# Patient Record
Sex: FEMALE | Race: WHITE
[De-identification: names, ages, dates, MRNs, and addresses within clinical notes are randomized per-mention and may not be internally consistent; named-entity substitution may affect disease eponyms.]

---

## 2021-04-02 ENCOUNTER — HOSPITAL ENCOUNTER (OUTPATIENT)
Age: 61
End: 2021-04-02
Payer: OTHER GOVERNMENT

## 2021-04-02 DIAGNOSIS — Z13.220: ICD-10-CM

## 2021-04-02 DIAGNOSIS — Z13.6: ICD-10-CM

## 2021-04-02 DIAGNOSIS — Z87.39: ICD-10-CM

## 2021-04-02 DIAGNOSIS — L90.0: Primary | ICD-10-CM

## 2021-04-02 LAB
ADD MANUAL DIFF / SLIDE REVIEW: NO
ALBUMIN SERPL-MCNC: 4.3 G/DL (ref 3.5–5)
ALBUMIN/GLOB SERPL: 1.3 {RATIO} (ref 1–2.8)
ALP SERPL-CCNC: 72 U/L (ref 38–126)
ALT SERPL-CCNC: 28 IU/L (ref ?–35)
BUN SERPL-MCNC: 19 MG/DL (ref 7–17)
CALCIUM SERPL-MCNC: 10.3 MG/DL (ref 8.4–10.2)
CHLORIDE SERPL-SCNC: 104 MMOL/L (ref 98–107)
CHOLEST SERPL-MCNC: 222 MG/DL (ref 140–199)
CO2 SERPL-SCNC: 35 MMOL/L (ref 22–32)
ESTIMATED GLOMERULAR FILT RATE: > 60 ML/MIN (ref 60–?)
GLOBULIN SER CALC-MCNC: 3.2 G/DL (ref 1.7–4.1)
GLUCOSE SERPL-MCNC: 71 MG/DL (ref 80–110)
HDLC SERPL-MCNC: 67 MG/DL (ref 40–60)
HEMATOCRIT: 43.9 % (ref 36–46)
HEMOGLOBIN: 14.9 G/DL (ref 12–16)
HEMOLYSIS: < 15 (ref 0–50)
LYMPHOCYTES # SPEC AUTO: 2000 /UL (ref 1100–4500)
MCV RBC: 90.8 FL (ref 80–100)
MEAN CORPUSCULAR HEMOGLOBIN: 30.9 PG (ref 26–34)
MEAN CORPUSCULAR HGB CONC: 34.1 % (ref 30–36)
PLATELET COUNT: 201 X10^3/UL (ref 150–400)
POTASSIUM SERPL-SCNC: 4.3 MMOL/L (ref 3.4–5.1)
PROT SERPL-MCNC: 7.5 G/DL (ref 6.3–8.2)
SODIUM SERPL-SCNC: 142 MMOL/L (ref 137–145)
TRIGL SERPL-MCNC: 129 MG/DL (ref 35–150)
URATE SERPL-MCNC: 3.6 MG/DL (ref 2.5–6.2)

## 2021-04-02 PROCEDURE — 36415 COLL VENOUS BLD VENIPUNCTURE: CPT

## 2021-04-02 PROCEDURE — 80061 LIPID PANEL: CPT

## 2021-04-02 PROCEDURE — 80053 COMPREHEN METABOLIC PANEL: CPT

## 2021-04-02 PROCEDURE — 84550 ASSAY OF BLOOD/URIC ACID: CPT

## 2021-04-02 PROCEDURE — 85025 COMPLETE CBC W/AUTO DIFF WBC: CPT

## 2021-04-13 ENCOUNTER — HOSPITAL ENCOUNTER (OUTPATIENT)
Dept: HOSPITAL 73 - LAB | Age: 61
End: 2021-04-13
Payer: OTHER GOVERNMENT

## 2021-04-13 DIAGNOSIS — R07.81: Primary | ICD-10-CM

## 2021-04-13 DIAGNOSIS — R91.1: ICD-10-CM

## 2021-04-13 PROCEDURE — 71101 X-RAY EXAM UNILAT RIBS/CHEST: CPT

## 2021-04-22 ENCOUNTER — HOSPITAL ENCOUNTER (OUTPATIENT)
Age: 61
End: 2021-04-22
Payer: OTHER GOVERNMENT

## 2021-04-22 DIAGNOSIS — R93.89: Primary | ICD-10-CM

## 2021-04-22 PROCEDURE — 71250 CT THORAX DX C-: CPT

## 2021-07-05 ENCOUNTER — HOSPITAL ENCOUNTER (OUTPATIENT)
Age: 61
End: 2021-07-05
Payer: OTHER GOVERNMENT

## 2021-07-05 DIAGNOSIS — Z20.822: ICD-10-CM

## 2021-07-05 DIAGNOSIS — Z01.812: Primary | ICD-10-CM

## 2021-07-05 PROCEDURE — 87635 SARS-COV-2 COVID-19 AMP PRB: CPT

## 2021-07-06 ENCOUNTER — HOSPITAL ENCOUNTER (OUTPATIENT)
Dept: HOSPITAL 73 - RAD | Age: 61
Discharge: HOME | End: 2021-07-06
Payer: OTHER GOVERNMENT

## 2021-07-06 VITALS
OXYGEN SATURATION: 97 % | DIASTOLIC BLOOD PRESSURE: 60 MMHG | HEART RATE: 74 BPM | RESPIRATION RATE: 16 BRPM | SYSTOLIC BLOOD PRESSURE: 95 MMHG

## 2021-07-06 VITALS
DIASTOLIC BLOOD PRESSURE: 80 MMHG | OXYGEN SATURATION: 100 % | HEART RATE: 75 BPM | SYSTOLIC BLOOD PRESSURE: 136 MMHG | RESPIRATION RATE: 14 BRPM

## 2021-07-06 VITALS
OXYGEN SATURATION: 96 % | HEART RATE: 62 BPM | SYSTOLIC BLOOD PRESSURE: 92 MMHG | DIASTOLIC BLOOD PRESSURE: 54 MMHG | RESPIRATION RATE: 12 BRPM

## 2021-07-06 VITALS
SYSTOLIC BLOOD PRESSURE: 83 MMHG | HEART RATE: 69 BPM | OXYGEN SATURATION: 98 % | RESPIRATION RATE: 14 BRPM | DIASTOLIC BLOOD PRESSURE: 52 MMHG

## 2021-07-06 VITALS
RESPIRATION RATE: 14 BRPM | DIASTOLIC BLOOD PRESSURE: 53 MMHG | SYSTOLIC BLOOD PRESSURE: 89 MMHG | OXYGEN SATURATION: 95 % | HEART RATE: 62 BPM

## 2021-07-06 VITALS
SYSTOLIC BLOOD PRESSURE: 112 MMHG | HEART RATE: 78 BPM | OXYGEN SATURATION: 97 % | TEMPERATURE: 97.1 F | DIASTOLIC BLOOD PRESSURE: 73 MMHG | RESPIRATION RATE: 20 BRPM

## 2021-07-06 VITALS
OXYGEN SATURATION: 95 % | DIASTOLIC BLOOD PRESSURE: 49 MMHG | HEART RATE: 68 BPM | RESPIRATION RATE: 16 BRPM | SYSTOLIC BLOOD PRESSURE: 77 MMHG

## 2021-07-06 VITALS
OXYGEN SATURATION: 100 % | SYSTOLIC BLOOD PRESSURE: 107 MMHG | HEART RATE: 72 BPM | RESPIRATION RATE: 14 BRPM | DIASTOLIC BLOOD PRESSURE: 69 MMHG

## 2021-07-06 VITALS
DIASTOLIC BLOOD PRESSURE: 50 MMHG | OXYGEN SATURATION: 94 % | SYSTOLIC BLOOD PRESSURE: 89 MMHG | RESPIRATION RATE: 16 BRPM | HEART RATE: 65 BPM

## 2021-07-06 VITALS
SYSTOLIC BLOOD PRESSURE: 112 MMHG | DIASTOLIC BLOOD PRESSURE: 73 MMHG | RESPIRATION RATE: 13 BRPM | OXYGEN SATURATION: 100 % | HEART RATE: 72 BPM

## 2021-07-06 DIAGNOSIS — M51.14: ICD-10-CM

## 2021-07-06 DIAGNOSIS — M48.04: Primary | ICD-10-CM

## 2021-07-06 PROCEDURE — 64479 NJX AA&/STRD TFRM EPI C/T 1: CPT

## 2021-07-06 PROCEDURE — 99152 MOD SED SAME PHYS/QHP 5/>YRS: CPT

## 2021-07-06 RX ADMIN — BUPIVACAINE HYDROCHLORIDE 2 ML: 2.5 INJECTION, SOLUTION EPIDURAL; INFILTRATION; INTRACAUDAL; PERINEURAL at 10:09

## 2021-07-06 RX ADMIN — IOPAMIDOL 3 ML: 612 INJECTION, SOLUTION INTRATHECAL at 10:06

## 2021-07-06 RX ADMIN — DEXAMETHASONE SODIUM PHOSPHATE 30 MG: 10 INJECTION INTRAMUSCULAR; INTRAVENOUS at 10:09

## 2021-07-06 RX ADMIN — FENTANYL CITRATE 50 MCG: 50 INJECTION, SOLUTION INTRAMUSCULAR; INTRAVENOUS at 10:06

## 2021-07-06 RX ADMIN — MIDAZOLAM HYDROCHLORIDE 5 MG: 1 INJECTION, SOLUTION INTRAMUSCULAR; INTRAVENOUS at 10:06

## 2022-01-25 ENCOUNTER — HOSPITAL ENCOUNTER (OUTPATIENT)
Age: 62
End: 2022-01-25
Payer: OTHER GOVERNMENT

## 2022-01-25 DIAGNOSIS — Z20.822: Primary | ICD-10-CM

## 2022-01-25 PROCEDURE — 87635 SARS-COV-2 COVID-19 AMP PRB: CPT

## 2022-01-27 ENCOUNTER — HOSPITAL ENCOUNTER (OUTPATIENT)
Age: 62
Discharge: HOME | End: 2022-01-27
Payer: OTHER GOVERNMENT

## 2022-01-27 VITALS
OXYGEN SATURATION: 99 % | RESPIRATION RATE: 18 BRPM | HEART RATE: 78 BPM | SYSTOLIC BLOOD PRESSURE: 112 MMHG | DIASTOLIC BLOOD PRESSURE: 63 MMHG

## 2022-01-27 VITALS
SYSTOLIC BLOOD PRESSURE: 115 MMHG | DIASTOLIC BLOOD PRESSURE: 80 MMHG | HEART RATE: 82 BPM | OXYGEN SATURATION: 99 % | RESPIRATION RATE: 13 BRPM

## 2022-01-27 VITALS
OXYGEN SATURATION: 100 % | HEART RATE: 81 BPM | DIASTOLIC BLOOD PRESSURE: 83 MMHG | SYSTOLIC BLOOD PRESSURE: 133 MMHG | RESPIRATION RATE: 20 BRPM

## 2022-01-27 VITALS
DIASTOLIC BLOOD PRESSURE: 74 MMHG | OXYGEN SATURATION: 96 % | RESPIRATION RATE: 16 BRPM | HEART RATE: 80 BPM | SYSTOLIC BLOOD PRESSURE: 110 MMHG | TEMPERATURE: 96.62 F

## 2022-01-27 VITALS
DIASTOLIC BLOOD PRESSURE: 68 MMHG | RESPIRATION RATE: 18 BRPM | SYSTOLIC BLOOD PRESSURE: 121 MMHG | HEART RATE: 79 BPM | OXYGEN SATURATION: 99 %

## 2022-01-27 VITALS
SYSTOLIC BLOOD PRESSURE: 111 MMHG | RESPIRATION RATE: 20 BRPM | HEART RATE: 84 BPM | OXYGEN SATURATION: 100 % | DIASTOLIC BLOOD PRESSURE: 76 MMHG

## 2022-01-27 VITALS
HEART RATE: 78 BPM | OXYGEN SATURATION: 100 % | SYSTOLIC BLOOD PRESSURE: 134 MMHG | RESPIRATION RATE: 20 BRPM | DIASTOLIC BLOOD PRESSURE: 86 MMHG

## 2022-01-27 VITALS
DIASTOLIC BLOOD PRESSURE: 74 MMHG | RESPIRATION RATE: 12 BRPM | SYSTOLIC BLOOD PRESSURE: 121 MMHG | HEART RATE: 76 BPM | OXYGEN SATURATION: 97 %

## 2022-01-27 VITALS
OXYGEN SATURATION: 100 % | RESPIRATION RATE: 12 BRPM | HEART RATE: 76 BPM | DIASTOLIC BLOOD PRESSURE: 74 MMHG | SYSTOLIC BLOOD PRESSURE: 126 MMHG

## 2022-01-27 DIAGNOSIS — M48.04: Primary | ICD-10-CM

## 2022-01-27 DIAGNOSIS — M51.14: ICD-10-CM

## 2022-01-27 PROCEDURE — 64479 NJX AA&/STRD TFRM EPI C/T 1: CPT

## 2022-01-27 PROCEDURE — 99152 MOD SED SAME PHYS/QHP 5/>YRS: CPT

## 2022-02-01 ENCOUNTER — HOSPITAL ENCOUNTER (OUTPATIENT)
Age: 62
End: 2022-02-01
Payer: OTHER GOVERNMENT

## 2022-02-01 DIAGNOSIS — F41.8: ICD-10-CM

## 2022-02-01 DIAGNOSIS — E78.5: Primary | ICD-10-CM

## 2022-02-01 LAB
ALBUMIN SERPL-MCNC: 3.8 G/DL (ref 3.5–5)
ALBUMIN/GLOB SERPL: 1.3 {RATIO} (ref 1–2.8)
ALP SERPL-CCNC: 54 U/L (ref 38–126)
ALT SERPL-CCNC: 21 IU/L (ref ?–35)
BUN SERPL-MCNC: 16 MG/DL (ref 7–17)
CALCIUM SERPL-MCNC: 8.8 MG/DL (ref 8.4–10.2)
CHLORIDE SERPL-SCNC: 104 MMOL/L (ref 98–107)
CHOLEST SERPL-MCNC: 197 MG/DL (ref 140–199)
CO2 SERPL-SCNC: 33 MMOL/L (ref 22–32)
ESTIMATED GLOMERULAR FILT RATE: > 60 ML/MIN (ref 60–?)
GLOBULIN SER CALC-MCNC: 3 G/DL (ref 1.7–4.1)
GLUCOSE SERPL-MCNC: 100 MG/DL (ref 80–110)
HDLC SERPL-MCNC: 69 MG/DL (ref 40–60)
HEMOLYSIS: < 15 (ref 0–50)
POTASSIUM SERPL-SCNC: 4.1 MMOL/L (ref 3.4–5.1)
PROT SERPL-MCNC: 6.8 G/DL (ref 6.3–8.2)
SODIUM SERPL-SCNC: 139 MMOL/L (ref 137–145)
TRIGL SERPL-MCNC: 138 MG/DL (ref 35–150)
TSH W/ REFLEX TO FT4: 1.38 UIU/ML (ref 0.47–4.68)

## 2022-02-01 PROCEDURE — 80061 LIPID PANEL: CPT

## 2022-02-01 PROCEDURE — 36415 COLL VENOUS BLD VENIPUNCTURE: CPT

## 2022-02-01 PROCEDURE — 80053 COMPREHEN METABOLIC PANEL: CPT

## 2022-02-01 PROCEDURE — 84443 ASSAY THYROID STIM HORMONE: CPT

## 2022-05-19 ENCOUNTER — HOSPITAL ENCOUNTER (OUTPATIENT)
Age: 62
End: 2022-05-19
Payer: OTHER GOVERNMENT

## 2022-05-19 DIAGNOSIS — Z12.31: Primary | ICD-10-CM

## 2022-05-19 DIAGNOSIS — M48.02: ICD-10-CM

## 2022-05-19 DIAGNOSIS — M50.11: ICD-10-CM

## 2022-05-19 DIAGNOSIS — M47.22: ICD-10-CM

## 2022-05-19 DIAGNOSIS — Z80.3: ICD-10-CM

## 2022-05-19 PROCEDURE — 77063 BREAST TOMOSYNTHESIS BI: CPT

## 2022-05-19 PROCEDURE — 72141 MRI NECK SPINE W/O DYE: CPT

## 2022-05-19 PROCEDURE — 77067 SCR MAMMO BI INCL CAD: CPT

## 2022-06-06 ENCOUNTER — HOSPITAL ENCOUNTER (OUTPATIENT)
Age: 62
End: 2022-06-06
Payer: OTHER GOVERNMENT

## 2022-06-06 DIAGNOSIS — Z20.822: Primary | ICD-10-CM

## 2022-06-06 PROCEDURE — 87635 SARS-COV-2 COVID-19 AMP PRB: CPT

## 2022-06-07 ENCOUNTER — HOSPITAL ENCOUNTER (OUTPATIENT)
Dept: HOSPITAL 73 - RAD | Age: 62
Discharge: HOME | End: 2022-06-07
Payer: OTHER GOVERNMENT

## 2022-06-07 VITALS
HEART RATE: 79 BPM | RESPIRATION RATE: 14 BRPM | SYSTOLIC BLOOD PRESSURE: 117 MMHG | OXYGEN SATURATION: 100 % | DIASTOLIC BLOOD PRESSURE: 78 MMHG

## 2022-06-07 VITALS
RESPIRATION RATE: 18 BRPM | HEART RATE: 71 BPM | DIASTOLIC BLOOD PRESSURE: 79 MMHG | SYSTOLIC BLOOD PRESSURE: 125 MMHG | OXYGEN SATURATION: 96 %

## 2022-06-07 VITALS
RESPIRATION RATE: 20 BRPM | OXYGEN SATURATION: 99 % | SYSTOLIC BLOOD PRESSURE: 122 MMHG | HEART RATE: 69 BPM | DIASTOLIC BLOOD PRESSURE: 71 MMHG

## 2022-06-07 VITALS
OXYGEN SATURATION: 98 % | RESPIRATION RATE: 14 BRPM | DIASTOLIC BLOOD PRESSURE: 68 MMHG | SYSTOLIC BLOOD PRESSURE: 108 MMHG | HEART RATE: 76 BPM

## 2022-06-07 VITALS
OXYGEN SATURATION: 96 % | TEMPERATURE: 97.52 F | RESPIRATION RATE: 18 BRPM | SYSTOLIC BLOOD PRESSURE: 109 MMHG | HEART RATE: 78 BPM | DIASTOLIC BLOOD PRESSURE: 78 MMHG

## 2022-06-07 VITALS
SYSTOLIC BLOOD PRESSURE: 123 MMHG | HEART RATE: 69 BPM | DIASTOLIC BLOOD PRESSURE: 73 MMHG | OXYGEN SATURATION: 98 % | RESPIRATION RATE: 14 BRPM

## 2022-06-07 VITALS
RESPIRATION RATE: 21 BRPM | HEART RATE: 75 BPM | OXYGEN SATURATION: 97 % | SYSTOLIC BLOOD PRESSURE: 125 MMHG | DIASTOLIC BLOOD PRESSURE: 70 MMHG

## 2022-06-07 VITALS
OXYGEN SATURATION: 96 % | SYSTOLIC BLOOD PRESSURE: 116 MMHG | DIASTOLIC BLOOD PRESSURE: 72 MMHG | RESPIRATION RATE: 16 BRPM | HEART RATE: 78 BPM

## 2022-06-07 VITALS
HEART RATE: 74 BPM | DIASTOLIC BLOOD PRESSURE: 75 MMHG | SYSTOLIC BLOOD PRESSURE: 136 MMHG | OXYGEN SATURATION: 98 % | RESPIRATION RATE: 15 BRPM

## 2022-06-07 VITALS
OXYGEN SATURATION: 100 % | SYSTOLIC BLOOD PRESSURE: 146 MMHG | HEART RATE: 80 BPM | RESPIRATION RATE: 19 BRPM | DIASTOLIC BLOOD PRESSURE: 85 MMHG

## 2022-06-07 DIAGNOSIS — M50.123: ICD-10-CM

## 2022-06-07 DIAGNOSIS — M48.02: Primary | ICD-10-CM

## 2022-06-07 PROCEDURE — 62321 NJX INTERLAMINAR CRV/THRC: CPT

## 2022-06-07 PROCEDURE — 99152 MOD SED SAME PHYS/QHP 5/>YRS: CPT

## 2022-06-07 RX ADMIN — IOPAMIDOL 3 ML: 612 INJECTION, SOLUTION INTRATHECAL at 09:22

## 2022-06-07 RX ADMIN — BUPIVACAINE HYDROCHLORIDE 2 ML: 2.5 INJECTION, SOLUTION EPIDURAL; INFILTRATION; INTRACAUDAL; PERINEURAL at 09:22

## 2022-06-07 RX ADMIN — MIDAZOLAM HYDROCHLORIDE 2 MG: 1 INJECTION, SOLUTION INTRAMUSCULAR; INTRAVENOUS at 09:17

## 2022-06-07 RX ADMIN — MIDAZOLAM HYDROCHLORIDE 2 MG: 1 INJECTION, SOLUTION INTRAMUSCULAR; INTRAVENOUS at 09:24

## 2022-06-07 RX ADMIN — DEXAMETHASONE SODIUM PHOSPHATE 30 MG: 10 INJECTION INTRAMUSCULAR; INTRAVENOUS at 09:22

## 2022-09-09 ENCOUNTER — HOSPITAL ENCOUNTER (OUTPATIENT)
Age: 62
End: 2022-09-09
Payer: OTHER GOVERNMENT

## 2022-09-09 DIAGNOSIS — K52.9: Primary | ICD-10-CM

## 2022-09-09 LAB
ADD MANUAL DIFF / SLIDE REVIEW: NO
ALBUMIN SERPL-MCNC: 4.2 G/DL (ref 3.5–5)
ALBUMIN/GLOB SERPL: 1.2 {RATIO} (ref 1–2.8)
ALP SERPL-CCNC: 71 U/L (ref 38–126)
ALT SERPL-CCNC: 40 IU/L (ref ?–35)
BUN SERPL-MCNC: 16 MG/DL (ref 7–17)
CALCIUM SERPL-MCNC: 9.3 MG/DL (ref 8.4–10.2)
CHLORIDE SERPL-SCNC: 104 MMOL/L (ref 98–107)
CO2 SERPL-SCNC: 31 MMOL/L (ref 22–32)
ESTIMATED GLOMERULAR FILT RATE: > 60 ML/MIN (ref 60–?)
GLOBULIN SER CALC-MCNC: 3.5 G/DL (ref 1.7–4.1)
GLUCOSE SERPL-MCNC: 90 MG/DL (ref 80–110)
HEMATOCRIT: 40.9 % (ref 36–46)
HEMOGLOBIN: 14.1 G/DL (ref 12–16)
HEMOLYSIS: < 15 (ref 0–50)
LYMPHOCYTES # SPEC AUTO: 1000 /UL (ref 1100–4500)
MCV RBC: 92.4 FL (ref 80–100)
MEAN CORPUSCULAR HEMOGLOBIN: 31.9 PG (ref 26–34)
MEAN CORPUSCULAR HGB CONC: 34.5 % (ref 30–36)
PLATELET COUNT: 160 X10^3/UL (ref 150–400)
POTASSIUM SERPL-SCNC: 4.2 MMOL/L (ref 3.4–5.1)
PROT SERPL-MCNC: 7.7 G/DL (ref 6.3–8.2)
SODIUM SERPL-SCNC: 140 MMOL/L (ref 137–145)

## 2022-09-09 PROCEDURE — 85025 COMPLETE CBC W/AUTO DIFF WBC: CPT

## 2022-09-09 PROCEDURE — 36415 COLL VENOUS BLD VENIPUNCTURE: CPT

## 2022-09-09 PROCEDURE — 80053 COMPREHEN METABOLIC PANEL: CPT

## 2022-09-09 PROCEDURE — 85651 RBC SED RATE NONAUTOMATED: CPT

## 2022-09-09 PROCEDURE — 86140 C-REACTIVE PROTEIN: CPT

## 2022-09-09 PROCEDURE — 74018 RADEX ABDOMEN 1 VIEW: CPT

## 2022-09-15 ENCOUNTER — HOSPITAL ENCOUNTER (OUTPATIENT)
Age: 62
End: 2022-09-15
Payer: OTHER GOVERNMENT

## 2022-09-15 DIAGNOSIS — K52.9: Primary | ICD-10-CM

## 2022-09-15 PROCEDURE — 82270 OCCULT BLOOD FECES: CPT

## 2022-09-15 PROCEDURE — 87205 SMEAR GRAM STAIN: CPT

## 2022-09-20 ENCOUNTER — HOSPITAL ENCOUNTER (OUTPATIENT)
Age: 62
End: 2022-09-20
Payer: OTHER GOVERNMENT
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
## 2022-09-20 DIAGNOSIS — M25.532: Primary | ICD-10-CM

## 2022-09-20 PROCEDURE — 73110 X-RAY EXAM OF WRIST: CPT

## 2022-09-20 PROCEDURE — 73130 X-RAY EXAM OF HAND: CPT

## 2022-09-20 PROCEDURE — 73090 X-RAY EXAM OF FOREARM: CPT

## 2022-09-20 PROCEDURE — 73080 X-RAY EXAM OF ELBOW: CPT

## 2023-02-15 ENCOUNTER — HOSPITAL ENCOUNTER (OUTPATIENT)
Age: 63
End: 2023-02-15
Payer: OTHER GOVERNMENT

## 2023-02-15 DIAGNOSIS — M35.00: ICD-10-CM

## 2023-02-15 DIAGNOSIS — M45.9: Primary | ICD-10-CM

## 2023-02-15 DIAGNOSIS — D84.9: ICD-10-CM

## 2023-02-15 DIAGNOSIS — Z51.81: ICD-10-CM

## 2023-02-15 LAB
ADD MANUAL DIFF / SLIDE REVIEW: NO
ALBUMIN SERPL-MCNC: 4.1 G/DL (ref 3.5–5)
ALBUMIN/GLOB SERPL: 1.2 {RATIO} (ref 1–2.8)
ALP SERPL-CCNC: 67 U/L (ref 38–126)
ALT SERPL-CCNC: 17 IU/L (ref ?–35)
BUN SERPL-MCNC: 11 MG/DL (ref 7–17)
CALCIUM SERPL-MCNC: 8.8 MG/DL (ref 8.4–10.2)
CHLORIDE SERPL-SCNC: 107 MMOL/L (ref 98–107)
CO2 SERPL-SCNC: 26 MMOL/L (ref 22–32)
ESTIMATED GLOMERULAR FILT RATE: > 60 ML/MIN (ref 60–?)
GLOBULIN SER CALC-MCNC: 3.5 G/DL (ref 1.7–4.1)
GLUCOSE SERPL-MCNC: 95 MG/DL (ref 80–110)
HEMATOCRIT: 41.6 % (ref 36–46)
HEMOGLOBIN: 14.2 G/DL (ref 12–16)
HEMOLYSIS: < 15 (ref 0–50)
LYMPHOCYTES # SPEC AUTO: 1300 /UL (ref 1100–4500)
MCV RBC: 91.1 FL (ref 80–100)
MEAN CORPUSCULAR HEMOGLOBIN: 31.1 PG (ref 26–34)
MEAN CORPUSCULAR HGB CONC: 34.1 % (ref 30–36)
PLATELET COUNT: 157 X10^3/UL (ref 150–400)
POTASSIUM SERPL-SCNC: 3.6 MMOL/L (ref 3.4–5.1)
PROT SERPL-MCNC: 7.6 G/DL (ref 6.3–8.2)
SODIUM SERPL-SCNC: 138 MMOL/L (ref 137–145)

## 2023-02-15 PROCEDURE — 86140 C-REACTIVE PROTEIN: CPT

## 2023-02-15 PROCEDURE — 85651 RBC SED RATE NONAUTOMATED: CPT

## 2023-02-15 PROCEDURE — 80053 COMPREHEN METABOLIC PANEL: CPT

## 2023-02-15 PROCEDURE — 85025 COMPLETE CBC W/AUTO DIFF WBC: CPT

## 2023-02-15 PROCEDURE — 36415 COLL VENOUS BLD VENIPUNCTURE: CPT

## 2023-03-07 ENCOUNTER — HOSPITAL ENCOUNTER (OUTPATIENT)
Age: 63
Discharge: HOME | End: 2023-03-07
Payer: OTHER GOVERNMENT

## 2023-03-07 VITALS
SYSTOLIC BLOOD PRESSURE: 139 MMHG | OXYGEN SATURATION: 99 % | HEART RATE: 84 BPM | RESPIRATION RATE: 22 BRPM | DIASTOLIC BLOOD PRESSURE: 78 MMHG

## 2023-03-07 VITALS
OXYGEN SATURATION: 99 % | DIASTOLIC BLOOD PRESSURE: 70 MMHG | SYSTOLIC BLOOD PRESSURE: 132 MMHG | HEART RATE: 85 BPM | RESPIRATION RATE: 16 BRPM

## 2023-03-07 VITALS
HEART RATE: 89 BPM | RESPIRATION RATE: 13 BRPM | OXYGEN SATURATION: 98 % | SYSTOLIC BLOOD PRESSURE: 137 MMHG | DIASTOLIC BLOOD PRESSURE: 92 MMHG

## 2023-03-07 VITALS
DIASTOLIC BLOOD PRESSURE: 87 MMHG | RESPIRATION RATE: 17 BRPM | SYSTOLIC BLOOD PRESSURE: 133 MMHG | OXYGEN SATURATION: 99 % | HEART RATE: 82 BPM

## 2023-03-07 VITALS
SYSTOLIC BLOOD PRESSURE: 122 MMHG | DIASTOLIC BLOOD PRESSURE: 90 MMHG | HEART RATE: 82 BPM | OXYGEN SATURATION: 98 % | RESPIRATION RATE: 15 BRPM

## 2023-03-07 VITALS
HEART RATE: 86 BPM | OXYGEN SATURATION: 96 % | DIASTOLIC BLOOD PRESSURE: 79 MMHG | TEMPERATURE: 96.9 F | SYSTOLIC BLOOD PRESSURE: 110 MMHG | RESPIRATION RATE: 16 BRPM

## 2023-03-07 VITALS
DIASTOLIC BLOOD PRESSURE: 89 MMHG | OXYGEN SATURATION: 98 % | HEART RATE: 84 BPM | RESPIRATION RATE: 17 BRPM | SYSTOLIC BLOOD PRESSURE: 125 MMHG

## 2023-03-07 VITALS
DIASTOLIC BLOOD PRESSURE: 82 MMHG | SYSTOLIC BLOOD PRESSURE: 141 MMHG | OXYGEN SATURATION: 100 % | HEART RATE: 82 BPM | RESPIRATION RATE: 14 BRPM

## 2023-03-07 DIAGNOSIS — M51.14: ICD-10-CM

## 2023-03-07 DIAGNOSIS — M48.04: Primary | ICD-10-CM

## 2023-03-07 PROCEDURE — 99152 MOD SED SAME PHYS/QHP 5/>YRS: CPT

## 2023-03-07 PROCEDURE — 64479 NJX AA&/STRD TFRM EPI C/T 1: CPT

## 2023-06-30 ENCOUNTER — HOSPITAL ENCOUNTER (EMERGENCY)
Age: 63
Discharge: HOME | End: 2023-06-30
Payer: OTHER GOVERNMENT

## 2023-06-30 VITALS — OXYGEN SATURATION: 100 % | DIASTOLIC BLOOD PRESSURE: 94 MMHG | SYSTOLIC BLOOD PRESSURE: 163 MMHG | HEART RATE: 81 BPM

## 2023-06-30 VITALS — DIASTOLIC BLOOD PRESSURE: 83 MMHG | SYSTOLIC BLOOD PRESSURE: 152 MMHG | OXYGEN SATURATION: 99 % | HEART RATE: 76 BPM

## 2023-06-30 VITALS — SYSTOLIC BLOOD PRESSURE: 152 MMHG | HEART RATE: 73 BPM | DIASTOLIC BLOOD PRESSURE: 89 MMHG | OXYGEN SATURATION: 98 %

## 2023-06-30 VITALS — BODY MASS INDEX: 29 KG/M2

## 2023-06-30 VITALS — HEART RATE: 78 BPM | SYSTOLIC BLOOD PRESSURE: 118 MMHG | DIASTOLIC BLOOD PRESSURE: 65 MMHG | OXYGEN SATURATION: 98 %

## 2023-06-30 VITALS
DIASTOLIC BLOOD PRESSURE: 89 MMHG | OXYGEN SATURATION: 99 % | SYSTOLIC BLOOD PRESSURE: 195 MMHG | HEART RATE: 82 BPM | TEMPERATURE: 97.52 F | RESPIRATION RATE: 16 BRPM

## 2023-06-30 VITALS — HEART RATE: 82 BPM | SYSTOLIC BLOOD PRESSURE: 153 MMHG | DIASTOLIC BLOOD PRESSURE: 88 MMHG | OXYGEN SATURATION: 97 %

## 2023-06-30 VITALS — HEART RATE: 75 BPM | OXYGEN SATURATION: 99 %

## 2023-06-30 VITALS — DIASTOLIC BLOOD PRESSURE: 88 MMHG | HEART RATE: 86 BPM | OXYGEN SATURATION: 97 % | SYSTOLIC BLOOD PRESSURE: 156 MMHG

## 2023-06-30 DIAGNOSIS — U07.1: Primary | ICD-10-CM

## 2023-06-30 DIAGNOSIS — R11.2: ICD-10-CM

## 2023-06-30 LAB
ADD MANUAL DIFF / SLIDE REVIEW: NO
ALBUMIN SERPL-MCNC: 4.4 G/DL (ref 3.5–5)
ALBUMIN/GLOB SERPL: 1.2 {RATIO} (ref 1–2.8)
ALP SERPL-CCNC: 70 U/L (ref 38–126)
ALT SERPL-CCNC: 29 IU/L (ref ?–35)
BUN SERPL-MCNC: 19 MG/DL (ref 7–17)
CALCIUM SERPL-MCNC: 9.3 MG/DL (ref 8.4–10.2)
CHLORIDE SERPL-SCNC: 101 MMOL/L (ref 98–107)
CO2 SERPL-SCNC: 27 MMOL/L (ref 22–32)
ESTIMATED GLOMERULAR FILT RATE: > 60 ML/MIN (ref 60–?)
GLOBULIN SER CALC-MCNC: 3.8 G/DL (ref 1.7–4.1)
GLUCOSE SERPL-MCNC: 89 MG/DL (ref 80–110)
HEMATOCRIT: 41.8 % (ref 36–46)
HEMOGLOBIN: 14.7 G/DL (ref 12–16)
HEMOLYSIS: < 15 (ref 0–50)
LYMPHOCYTES # SPEC AUTO: 1500 /UL (ref 1100–4500)
MCV RBC: 90.3 FL (ref 80–100)
MEAN CORPUSCULAR HEMOGLOBIN: 31.7 PG (ref 26–34)
MEAN CORPUSCULAR HGB CONC: 35.1 % (ref 30–36)
PLATELET COUNT: 163 X10^3/UL (ref 150–400)
POTASSIUM SERPL-SCNC: 3.8 MMOL/L (ref 3.4–5.1)
PROT SERPL-MCNC: 8.2 G/DL (ref 6.3–8.2)
SODIUM SERPL-SCNC: 137 MMOL/L (ref 137–145)

## 2023-06-30 PROCEDURE — 80053 COMPREHEN METABOLIC PANEL: CPT

## 2023-06-30 PROCEDURE — 96374 THER/PROPH/DIAG INJ IV PUSH: CPT

## 2023-06-30 PROCEDURE — 96375 TX/PRO/DX INJ NEW DRUG ADDON: CPT

## 2023-06-30 PROCEDURE — 99284 EMERGENCY DEPT VISIT MOD MDM: CPT

## 2023-06-30 PROCEDURE — 85025 COMPLETE CBC W/AUTO DIFF WBC: CPT

## 2023-06-30 PROCEDURE — 36415 COLL VENOUS BLD VENIPUNCTURE: CPT

## 2023-06-30 PROCEDURE — 96361 HYDRATE IV INFUSION ADD-ON: CPT

## 2023-07-17 ENCOUNTER — HOSPITAL ENCOUNTER (OUTPATIENT)
Age: 63
End: 2023-07-17
Payer: OTHER GOVERNMENT

## 2023-07-17 DIAGNOSIS — M47.819: Primary | ICD-10-CM

## 2023-07-17 LAB
ADD MANUAL DIFF / SLIDE REVIEW: NO
ALBUMIN SERPL-MCNC: 4 G/DL (ref 3.5–5)
ALBUMIN/GLOB SERPL: 1.3 {RATIO} (ref 1–2.8)
ALP SERPL-CCNC: 53 U/L (ref 38–126)
ALT SERPL-CCNC: 21 IU/L (ref ?–35)
BUN SERPL-MCNC: 13 MG/DL (ref 7–17)
CALCIUM SERPL-MCNC: 9.2 MG/DL (ref 8.4–10.2)
CHLORIDE SERPL-SCNC: 102 MMOL/L (ref 98–107)
CO2 SERPL-SCNC: 30 MMOL/L (ref 22–32)
ESTIMATED GLOMERULAR FILT RATE: > 60 ML/MIN (ref 60–?)
GLOBULIN SER CALC-MCNC: 3.2 G/DL (ref 1.7–4.1)
GLUCOSE SERPL-MCNC: 94 MG/DL (ref 80–110)
HEMATOCRIT: 36.3 % (ref 36–46)
HEMOGLOBIN: 12.8 G/DL (ref 12–16)
HEMOLYSIS: < 15 (ref 0–50)
LYMPHOCYTES # SPEC AUTO: 900 /UL (ref 1100–4500)
MCV RBC: 90.6 FL (ref 80–100)
MEAN CORPUSCULAR HEMOGLOBIN: 31.9 PG (ref 26–34)
MEAN CORPUSCULAR HGB CONC: 35.2 % (ref 30–36)
PLATELET COUNT: 181 X10^3/UL (ref 150–400)
POTASSIUM SERPL-SCNC: 3.3 MMOL/L (ref 3.4–5.1)
PROT SERPL-MCNC: 7.2 G/DL (ref 6.3–8.2)
SODIUM SERPL-SCNC: 139 MMOL/L (ref 137–145)

## 2023-07-17 PROCEDURE — 86140 C-REACTIVE PROTEIN: CPT

## 2023-07-17 PROCEDURE — 85651 RBC SED RATE NONAUTOMATED: CPT

## 2023-07-17 PROCEDURE — 80053 COMPREHEN METABOLIC PANEL: CPT

## 2023-07-17 PROCEDURE — 85025 COMPLETE CBC W/AUTO DIFF WBC: CPT

## 2023-07-17 PROCEDURE — 36415 COLL VENOUS BLD VENIPUNCTURE: CPT

## 2023-07-25 ENCOUNTER — HOSPITAL ENCOUNTER (OUTPATIENT)
Age: 63
End: 2023-07-25
Payer: OTHER GOVERNMENT

## 2023-07-25 DIAGNOSIS — Z80.3: ICD-10-CM

## 2023-07-25 DIAGNOSIS — Z12.31: Primary | ICD-10-CM

## 2023-07-25 PROCEDURE — 77067 SCR MAMMO BI INCL CAD: CPT

## 2023-07-25 PROCEDURE — 77063 BREAST TOMOSYNTHESIS BI: CPT

## 2023-11-08 ENCOUNTER — HOSPITAL ENCOUNTER (OUTPATIENT)
Age: 63
End: 2023-11-08
Payer: OTHER GOVERNMENT

## 2023-11-08 DIAGNOSIS — M54.50: ICD-10-CM

## 2023-11-08 DIAGNOSIS — M79.606: ICD-10-CM

## 2023-11-08 DIAGNOSIS — D84.9: ICD-10-CM

## 2023-11-08 DIAGNOSIS — R21: ICD-10-CM

## 2023-11-08 DIAGNOSIS — M45.9: ICD-10-CM

## 2023-11-08 DIAGNOSIS — M47.816: ICD-10-CM

## 2023-11-08 DIAGNOSIS — M47.817: Primary | ICD-10-CM

## 2023-11-08 DIAGNOSIS — E78.5: ICD-10-CM

## 2023-11-08 DIAGNOSIS — M06.9: ICD-10-CM

## 2023-11-08 LAB
ADD MANUAL DIFF / SLIDE REVIEW: NO
ALBUMIN SERPL-MCNC: 4.1 G/DL (ref 3.5–5)
ALBUMIN/GLOB SERPL: 1.3 {RATIO} (ref 1–2.8)
ALP SERPL-CCNC: 60 U/L (ref 38–126)
ALT SERPL-CCNC: 19 IU/L (ref ?–35)
BUN SERPL-MCNC: 17 MG/DL (ref 7–17)
CALCIUM SERPL-MCNC: 9.4 MG/DL (ref 8.4–10.2)
CHLORIDE SERPL-SCNC: 106 MMOL/L (ref 98–107)
CHOLEST SERPL-MCNC: 248 MG/DL (ref 140–199)
CO2 SERPL-SCNC: 24 MMOL/L (ref 22–32)
CRP SERPL HS-MCNC: 9 MG/L (ref 1–3)
ESTIMATED GLOMERULAR FILT RATE: > 60 ML/MIN (ref 60–?)
GLOBULIN SER CALC-MCNC: 3.1 G/DL (ref 1.7–4.1)
GLUCOSE SERPL-MCNC: 100 MG/DL (ref 80–110)
HDLC SERPL-MCNC: 79 MG/DL (ref 40–60)
HEMATOCRIT: 41.9 % (ref 36–46)
HEMOGLOBIN: 14.5 G/DL (ref 12–16)
HEMOLYSIS: < 15 (ref 0–50)
LYMPHOCYTES # SPEC AUTO: 1200 /UL (ref 1100–4500)
MCV RBC: 89 FL (ref 80–100)
MEAN CORPUSCULAR HEMOGLOBIN: 30.8 PG (ref 26–34)
MEAN CORPUSCULAR HGB CONC: 34.6 % (ref 30–36)
PLATELET COUNT: 183 X10^3/UL (ref 150–400)
POTASSIUM SERPL-SCNC: 4 MMOL/L (ref 3.4–5.1)
PROT SERPL-MCNC: 7.2 G/DL (ref 6.3–8.2)
SODIUM SERPL-SCNC: 140 MMOL/L (ref 137–145)
TRIGL SERPL-MCNC: 118 MG/DL (ref 35–150)

## 2023-11-08 PROCEDURE — 85651 RBC SED RATE NONAUTOMATED: CPT

## 2023-11-08 PROCEDURE — 72100 X-RAY EXAM L-S SPINE 2/3 VWS: CPT

## 2023-11-08 PROCEDURE — 85025 COMPLETE CBC W/AUTO DIFF WBC: CPT

## 2023-11-08 PROCEDURE — 83516 IMMUNOASSAY NONANTIBODY: CPT

## 2023-11-08 PROCEDURE — 36415 COLL VENOUS BLD VENIPUNCTURE: CPT

## 2023-11-08 PROCEDURE — 86140 C-REACTIVE PROTEIN: CPT

## 2023-11-08 PROCEDURE — 80061 LIPID PANEL: CPT

## 2023-11-08 PROCEDURE — 86235 NUCLEAR ANTIGEN ANTIBODY: CPT

## 2023-11-08 PROCEDURE — 80053 COMPREHEN METABOLIC PANEL: CPT

## 2023-11-08 PROCEDURE — 86038 ANTINUCLEAR ANTIBODIES: CPT

## 2023-11-08 PROCEDURE — 86225 DNA ANTIBODY NATIVE: CPT

## 2023-11-14 ENCOUNTER — HOSPITAL ENCOUNTER (OUTPATIENT)
Age: 63
End: 2023-11-14
Payer: OTHER GOVERNMENT

## 2023-11-14 DIAGNOSIS — M51.37: ICD-10-CM

## 2023-11-14 DIAGNOSIS — M48.061: ICD-10-CM

## 2023-11-14 DIAGNOSIS — G89.29: ICD-10-CM

## 2023-11-14 DIAGNOSIS — M51.36: ICD-10-CM

## 2023-11-14 DIAGNOSIS — M47.817: ICD-10-CM

## 2023-11-14 DIAGNOSIS — M48.07: ICD-10-CM

## 2023-11-14 DIAGNOSIS — M47.816: Primary | ICD-10-CM

## 2023-11-14 DIAGNOSIS — M79.606: ICD-10-CM

## 2023-11-14 DIAGNOSIS — M54.50: ICD-10-CM

## 2023-11-14 PROCEDURE — 72148 MRI LUMBAR SPINE W/O DYE: CPT

## 2023-12-10 ENCOUNTER — HOSPITAL ENCOUNTER (OUTPATIENT)
Dept: HOSPITAL 73 - ED | Age: 63
Setting detail: OBSERVATION
LOS: 1 days | Discharge: HOME | End: 2023-12-11
Payer: OTHER GOVERNMENT

## 2023-12-10 VITALS
DIASTOLIC BLOOD PRESSURE: 81 MMHG | HEART RATE: 86 BPM | RESPIRATION RATE: 22 BRPM | OXYGEN SATURATION: 100 % | SYSTOLIC BLOOD PRESSURE: 161 MMHG

## 2023-12-10 VITALS
OXYGEN SATURATION: 95 % | DIASTOLIC BLOOD PRESSURE: 75 MMHG | HEART RATE: 84 BPM | SYSTOLIC BLOOD PRESSURE: 125 MMHG | RESPIRATION RATE: 13 BRPM

## 2023-12-10 VITALS
RESPIRATION RATE: 22 BRPM | DIASTOLIC BLOOD PRESSURE: 100 MMHG | HEART RATE: 103 BPM | SYSTOLIC BLOOD PRESSURE: 185 MMHG | TEMPERATURE: 96.44 F | OXYGEN SATURATION: 100 %

## 2023-12-10 VITALS
HEART RATE: 75 BPM | DIASTOLIC BLOOD PRESSURE: 91 MMHG | SYSTOLIC BLOOD PRESSURE: 168 MMHG | OXYGEN SATURATION: 99 % | RESPIRATION RATE: 18 BRPM

## 2023-12-10 VITALS
DIASTOLIC BLOOD PRESSURE: 94 MMHG | OXYGEN SATURATION: 99 % | TEMPERATURE: 96.6 F | HEART RATE: 75 BPM | RESPIRATION RATE: 18 BRPM | SYSTOLIC BLOOD PRESSURE: 153 MMHG

## 2023-12-10 VITALS — HEART RATE: 94 BPM | SYSTOLIC BLOOD PRESSURE: 127 MMHG | DIASTOLIC BLOOD PRESSURE: 77 MMHG

## 2023-12-10 VITALS
OXYGEN SATURATION: 100 % | SYSTOLIC BLOOD PRESSURE: 169 MMHG | HEART RATE: 79 BPM | DIASTOLIC BLOOD PRESSURE: 85 MMHG | RESPIRATION RATE: 16 BRPM

## 2023-12-10 VITALS — DIASTOLIC BLOOD PRESSURE: 73 MMHG | SYSTOLIC BLOOD PRESSURE: 136 MMHG

## 2023-12-10 VITALS
OXYGEN SATURATION: 93 % | SYSTOLIC BLOOD PRESSURE: 139 MMHG | HEART RATE: 93 BPM | RESPIRATION RATE: 19 BRPM | DIASTOLIC BLOOD PRESSURE: 73 MMHG

## 2023-12-10 VITALS — SYSTOLIC BLOOD PRESSURE: 156 MMHG | HEART RATE: 75 BPM | DIASTOLIC BLOOD PRESSURE: 83 MMHG | OXYGEN SATURATION: 97 %

## 2023-12-10 VITALS
HEART RATE: 94 BPM | OXYGEN SATURATION: 91 % | DIASTOLIC BLOOD PRESSURE: 67 MMHG | SYSTOLIC BLOOD PRESSURE: 127 MMHG | RESPIRATION RATE: 19 BRPM

## 2023-12-10 VITALS
RESPIRATION RATE: 22 BRPM | HEART RATE: 79 BPM | SYSTOLIC BLOOD PRESSURE: 196 MMHG | OXYGEN SATURATION: 99 % | DIASTOLIC BLOOD PRESSURE: 90 MMHG

## 2023-12-10 VITALS — OXYGEN SATURATION: 99 % | RESPIRATION RATE: 21 BRPM | HEART RATE: 78 BPM

## 2023-12-10 VITALS
SYSTOLIC BLOOD PRESSURE: 152 MMHG | OXYGEN SATURATION: 96 % | HEART RATE: 74 BPM | DIASTOLIC BLOOD PRESSURE: 74 MMHG | RESPIRATION RATE: 13 BRPM

## 2023-12-10 VITALS — SYSTOLIC BLOOD PRESSURE: 139 MMHG | DIASTOLIC BLOOD PRESSURE: 73 MMHG

## 2023-12-10 VITALS
SYSTOLIC BLOOD PRESSURE: 176 MMHG | DIASTOLIC BLOOD PRESSURE: 86 MMHG | HEART RATE: 86 BPM | RESPIRATION RATE: 21 BRPM | OXYGEN SATURATION: 97 %

## 2023-12-10 VITALS — SYSTOLIC BLOOD PRESSURE: 168 MMHG | DIASTOLIC BLOOD PRESSURE: 91 MMHG | HEART RATE: 74 BPM

## 2023-12-10 VITALS — SYSTOLIC BLOOD PRESSURE: 185 MMHG | DIASTOLIC BLOOD PRESSURE: 100 MMHG

## 2023-12-10 VITALS — BODY MASS INDEX: 31.3 KG/M2

## 2023-12-10 VITALS
SYSTOLIC BLOOD PRESSURE: 132 MMHG | OXYGEN SATURATION: 94 % | DIASTOLIC BLOOD PRESSURE: 72 MMHG | HEART RATE: 95 BPM | RESPIRATION RATE: 13 BRPM

## 2023-12-10 VITALS
HEART RATE: 92 BPM | DIASTOLIC BLOOD PRESSURE: 69 MMHG | RESPIRATION RATE: 13 BRPM | SYSTOLIC BLOOD PRESSURE: 134 MMHG | OXYGEN SATURATION: 95 %

## 2023-12-10 VITALS — BODY MASS INDEX: 31.3 KG/M2 | BODY MASS INDEX: 31.3 KG/M2

## 2023-12-10 VITALS — OXYGEN SATURATION: 99 % | HEART RATE: 75 BPM

## 2023-12-10 VITALS — OXYGEN SATURATION: 99 %

## 2023-12-10 DIAGNOSIS — Z98.84: ICD-10-CM

## 2023-12-10 DIAGNOSIS — R07.9: Primary | ICD-10-CM

## 2023-12-10 DIAGNOSIS — R06.02: ICD-10-CM

## 2023-12-10 DIAGNOSIS — R61: ICD-10-CM

## 2023-12-10 DIAGNOSIS — F32.A: ICD-10-CM

## 2023-12-10 DIAGNOSIS — E78.5: ICD-10-CM

## 2023-12-10 DIAGNOSIS — F41.9: ICD-10-CM

## 2023-12-10 LAB
ADD MANUAL DIFF / SLIDE REVIEW: NO
ALBUMIN SERPL-MCNC: 4.5 G/DL (ref 3.5–5)
ALBUMIN/GLOB SERPL: 1.2 {RATIO} (ref 1–2.8)
ALP SERPL-CCNC: 57 U/L (ref 38–126)
ALT SERPL-CCNC: 15 IU/L (ref ?–35)
BUN SERPL-MCNC: 9 MG/DL (ref 7–17)
CALCIUM SERPL-MCNC: 10 MG/DL (ref 8.4–10.2)
CHLORIDE SERPL-SCNC: 104 MMOL/L (ref 98–107)
CK SERPL-CCNC: 39 U/L (ref 30–135)
CO2 SERPL-SCNC: 28 MMOL/L (ref 22–32)
ESTIMATED GLOMERULAR FILT RATE: > 60 ML/MIN (ref 60–?)
GLOBULIN SER CALC-MCNC: 3.7 G/DL (ref 1.7–4.1)
GLUCOSE SERPL-MCNC: 108 MG/DL (ref 80–110)
HEMATOCRIT: 42.5 % (ref 36–46)
HEMOGLOBIN: 14.8 G/DL (ref 12–16)
HEMOLYSIS: < 15 (ref 0–50)
INR PPP: 1 (ref 0.9–1.3)
LIPASE SERPL-CCNC: 75 U/L (ref 23–300)
LYMPHOCYTES # SPEC AUTO: 1400 /UL (ref 1100–4500)
MAGNESIUM SERPL-MCNC: 1.9 MG/DL (ref 1.6–2.3)
MCV RBC: 90.7 FL (ref 80–100)
MEAN CORPUSCULAR HEMOGLOBIN: 31.5 PG (ref 26–34)
MEAN CORPUSCULAR HGB CONC: 34.8 % (ref 30–36)
PLATELET COUNT: 202 X10^3/UL (ref 150–400)
POTASSIUM SERPL-SCNC: 3.4 MMOL/L (ref 3.4–5.1)
PROT SERPL-MCNC: 8.2 G/DL (ref 6.3–8.2)
PROTHROMBIN TIME: 11.9 SECONDS (ref 9.4–12.5)
PTT PARTIAL THROMBOPLASTIN TIM: 30 SECONDS (ref 25.1–36.5)
SODIUM SERPL-SCNC: 138 MMOL/L (ref 137–145)
TROPONIN I SERPL-MCNC: < 0.012 NG/ML (ref 0.01–0.03)
TROPONIN I SERPL-MCNC: < 0.012 NG/ML (ref 0.01–0.03)

## 2023-12-10 PROCEDURE — 93005 ELECTROCARDIOGRAM TRACING: CPT

## 2023-12-10 PROCEDURE — 84484 ASSAY OF TROPONIN QUANT: CPT

## 2023-12-10 PROCEDURE — 82550 ASSAY OF CK (CPK): CPT

## 2023-12-10 PROCEDURE — 99284 EMERGENCY DEPT VISIT MOD MDM: CPT

## 2023-12-10 PROCEDURE — 96376 TX/PRO/DX INJ SAME DRUG ADON: CPT

## 2023-12-10 PROCEDURE — 93010 ELECTROCARDIOGRAM REPORT: CPT

## 2023-12-10 PROCEDURE — 83690 ASSAY OF LIPASE: CPT

## 2023-12-10 PROCEDURE — 78452 HT MUSCLE IMAGE SPECT MULT: CPT

## 2023-12-10 PROCEDURE — 80053 COMPREHEN METABOLIC PANEL: CPT

## 2023-12-10 PROCEDURE — 85025 COMPLETE CBC W/AUTO DIFF WBC: CPT

## 2023-12-10 PROCEDURE — 71045 X-RAY EXAM CHEST 1 VIEW: CPT

## 2023-12-10 PROCEDURE — 85730 THROMBOPLASTIN TIME PARTIAL: CPT

## 2023-12-10 PROCEDURE — A9502 TC99M TETROFOSMIN: HCPCS

## 2023-12-10 PROCEDURE — 96374 THER/PROPH/DIAG INJ IV PUSH: CPT

## 2023-12-10 PROCEDURE — 36415 COLL VENOUS BLD VENIPUNCTURE: CPT

## 2023-12-10 PROCEDURE — 93306 TTE W/DOPPLER COMPLETE: CPT

## 2023-12-10 PROCEDURE — 85379 FIBRIN DEGRADATION QUANT: CPT

## 2023-12-10 PROCEDURE — G0378 HOSPITAL OBSERVATION PER HR: HCPCS

## 2023-12-10 PROCEDURE — 93017 CV STRESS TEST TRACING ONLY: CPT

## 2023-12-10 PROCEDURE — 80048 BASIC METABOLIC PNL TOTAL CA: CPT

## 2023-12-10 PROCEDURE — 85007 BL SMEAR W/DIFF WBC COUNT: CPT

## 2023-12-10 PROCEDURE — 83735 ASSAY OF MAGNESIUM: CPT

## 2023-12-10 PROCEDURE — 85610 PROTHROMBIN TIME: CPT

## 2023-12-10 RX ADMIN — NITROGLYCERIN 0.4 MG: 0.4 TABLET SUBLINGUAL at 19:55

## 2023-12-10 RX ADMIN — NITROGLYCERIN 0.5 INCH: 20 OINTMENT TOPICAL at 22:49

## 2023-12-10 RX ADMIN — NITROGLYCERIN 0.4 MG: 0.4 TABLET SUBLINGUAL at 19:50

## 2023-12-10 RX ADMIN — NITROGLYCERIN 0.4 MG: 0.4 TABLET SUBLINGUAL at 20:01

## 2023-12-10 RX ADMIN — ONDANSETRON 4 MG: 2 INJECTION INTRAMUSCULAR; INTRAVENOUS at 18:48

## 2023-12-10 RX ADMIN — ASPIRIN 324 MG: 81 TABLET, CHEWABLE ORAL at 18:52

## 2023-12-11 VITALS
SYSTOLIC BLOOD PRESSURE: 156 MMHG | TEMPERATURE: 96.8 F | DIASTOLIC BLOOD PRESSURE: 96 MMHG | RESPIRATION RATE: 18 BRPM | HEART RATE: 86 BPM | OXYGEN SATURATION: 97 %

## 2023-12-11 VITALS
RESPIRATION RATE: 18 BRPM | HEART RATE: 80 BPM | SYSTOLIC BLOOD PRESSURE: 148 MMHG | TEMPERATURE: 96.7 F | OXYGEN SATURATION: 97 % | DIASTOLIC BLOOD PRESSURE: 94 MMHG

## 2023-12-11 VITALS
HEART RATE: 85 BPM | TEMPERATURE: 97.7 F | DIASTOLIC BLOOD PRESSURE: 98 MMHG | RESPIRATION RATE: 18 BRPM | SYSTOLIC BLOOD PRESSURE: 150 MMHG | OXYGEN SATURATION: 98 %

## 2023-12-11 VITALS
RESPIRATION RATE: 18 BRPM | HEART RATE: 79 BPM | TEMPERATURE: 97.6 F | OXYGEN SATURATION: 95 % | DIASTOLIC BLOOD PRESSURE: 92 MMHG | SYSTOLIC BLOOD PRESSURE: 156 MMHG

## 2023-12-11 LAB
ADD MANUAL DIFF / SLIDE REVIEW: YES
BUN SERPL-MCNC: 10 MG/DL (ref 7–17)
CALCIUM SERPL-MCNC: 10.1 MG/DL (ref 8.4–10.2)
CHLORIDE SERPL-SCNC: 107 MMOL/L (ref 98–107)
CO2 SERPL-SCNC: 27 MMOL/L (ref 22–32)
EOSINOPHILS PERCENT MANUAL: 3 % (ref 2–4)
ESTIMATED GLOMERULAR FILT RATE: > 60 ML/MIN (ref 60–?)
GLUCOSE SERPL-MCNC: 118 MG/DL (ref 80–110)
HEMATOCRIT: 40.1 % (ref 36–46)
HEMOGLOBIN: 13.7 G/DL (ref 12–16)
HEMOLYSIS: 15 (ref 0–50)
LYMPHOCYTES PERCENT MANUAL: 37 % (ref 25–45)
MCV RBC: 90.4 FL (ref 80–100)
MEAN CORPUSCULAR HEMOGLOBIN: 31 PG (ref 26–34)
MEAN CORPUSCULAR HGB CONC: 34.3 % (ref 30–36)
MONOCYTES PERCENT MANUAL: 4 % (ref 2–11)
NEUTROPHILS ABSOLUTE MANUAL: 3528 /UL (ref 3000–5900)
NEUTS SEG NFR BLD: 56 % (ref 38–70)
PLATELET COUNT: 175 X10^3/UL (ref 150–400)
POTASSIUM SERPL-SCNC: 3.7 MMOL/L (ref 3.4–5.1)
SODIUM SERPL-SCNC: 137 MMOL/L (ref 137–145)
TOTAL CELLS COUNTED: 100
TROPONIN I SERPL-MCNC: < 0.012 NG/ML (ref 0.01–0.03)

## 2023-12-11 RX ADMIN — GABAPENTIN 600 MG: 300 CAPSULE ORAL at 16:17

## 2023-12-11 RX ADMIN — Medication 650 MG: at 04:00

## 2023-12-11 RX ADMIN — ONDANSETRON 4 MG: 2 INJECTION INTRAMUSCULAR; INTRAVENOUS at 13:35

## 2023-12-11 RX ADMIN — DULOXETINE HYDROCHLORIDE 60 MG: 20 CAPSULE, DELAYED RELEASE ORAL at 16:17

## 2023-12-11 RX ADMIN — Medication 650 MG: at 12:45

## 2023-12-11 RX ADMIN — ONDANSETRON 4 MG: 2 INJECTION INTRAMUSCULAR; INTRAVENOUS at 00:31

## 2023-12-11 RX ADMIN — CALCIUM CARBONATE (ANTACID) CHEW TAB 500 MG 1000 MG: 500 CHEW TAB at 00:31

## 2024-02-20 ENCOUNTER — HOSPITAL ENCOUNTER (OUTPATIENT)
Dept: HOSPITAL 73 - RAD | Age: 64
Discharge: HOME | End: 2024-02-20
Payer: OTHER GOVERNMENT

## 2024-02-20 VITALS
DIASTOLIC BLOOD PRESSURE: 71 MMHG | SYSTOLIC BLOOD PRESSURE: 126 MMHG | OXYGEN SATURATION: 98 % | RESPIRATION RATE: 18 BRPM | HEART RATE: 81 BPM

## 2024-02-20 VITALS
OXYGEN SATURATION: 95 % | DIASTOLIC BLOOD PRESSURE: 77 MMHG | TEMPERATURE: 97.16 F | SYSTOLIC BLOOD PRESSURE: 134 MMHG | HEART RATE: 65 BPM | RESPIRATION RATE: 18 BRPM

## 2024-02-20 VITALS
DIASTOLIC BLOOD PRESSURE: 76 MMHG | SYSTOLIC BLOOD PRESSURE: 126 MMHG | HEART RATE: 81 BPM | RESPIRATION RATE: 14 BRPM | OXYGEN SATURATION: 100 %

## 2024-02-20 VITALS
OXYGEN SATURATION: 100 % | SYSTOLIC BLOOD PRESSURE: 133 MMHG | DIASTOLIC BLOOD PRESSURE: 72 MMHG | HEART RATE: 83 BPM | RESPIRATION RATE: 21 BRPM

## 2024-02-20 VITALS
RESPIRATION RATE: 16 BRPM | DIASTOLIC BLOOD PRESSURE: 79 MMHG | SYSTOLIC BLOOD PRESSURE: 129 MMHG | HEART RATE: 80 BPM | OXYGEN SATURATION: 100 %

## 2024-02-20 VITALS
HEART RATE: 80 BPM | DIASTOLIC BLOOD PRESSURE: 69 MMHG | OXYGEN SATURATION: 99 % | SYSTOLIC BLOOD PRESSURE: 123 MMHG | RESPIRATION RATE: 18 BRPM

## 2024-02-20 VITALS
OXYGEN SATURATION: 100 % | DIASTOLIC BLOOD PRESSURE: 76 MMHG | RESPIRATION RATE: 14 BRPM | SYSTOLIC BLOOD PRESSURE: 133 MMHG | HEART RATE: 80 BPM

## 2024-02-20 VITALS — BODY MASS INDEX: 31.3 KG/M2

## 2024-02-20 VITALS
HEART RATE: 84 BPM | OXYGEN SATURATION: 98 % | RESPIRATION RATE: 20 BRPM | SYSTOLIC BLOOD PRESSURE: 138 MMHG | DIASTOLIC BLOOD PRESSURE: 78 MMHG

## 2024-02-20 DIAGNOSIS — M47.26: ICD-10-CM

## 2024-02-20 DIAGNOSIS — M51.16: ICD-10-CM

## 2024-02-20 DIAGNOSIS — M48.061: Primary | ICD-10-CM

## 2024-02-20 PROCEDURE — 99152 MOD SED SAME PHYS/QHP 5/>YRS: CPT

## 2024-02-20 PROCEDURE — 64483 NJX AA&/STRD TFRM EPI L/S 1: CPT

## 2024-02-20 RX ADMIN — DEXAMETHASONE SODIUM PHOSPHATE 10 MG: 10 INJECTION INTRAMUSCULAR; INTRAVENOUS at 10:41

## 2024-02-20 RX ADMIN — MIDAZOLAM HYDROCHLORIDE 1 MG: 1 INJECTION, SOLUTION INTRAMUSCULAR; INTRAVENOUS at 10:35

## 2024-02-20 RX ADMIN — BUPIVACAINE HYDROCHLORIDE 2 ML: 2.5 INJECTION, SOLUTION EPIDURAL; INFILTRATION; INTRACAUDAL; PERINEURAL at 10:41

## 2024-02-20 RX ADMIN — BETAMETHASONE SODIUM PHOSPHATE AND BETAMETHASONE ACETATE 6 MG: 3; 3 INJECTION, SUSPENSION INTRA-ARTICULAR; INTRALESIONAL; INTRAMUSCULAR at 10:40

## 2024-04-09 ENCOUNTER — HOSPITAL ENCOUNTER (OUTPATIENT)
Dept: HOSPITAL 73 - RAD | Age: 64
Discharge: HOME | End: 2024-04-09
Payer: OTHER GOVERNMENT

## 2024-04-09 VITALS — BODY MASS INDEX: 31.3 KG/M2

## 2024-04-09 VITALS
OXYGEN SATURATION: 98 % | SYSTOLIC BLOOD PRESSURE: 109 MMHG | TEMPERATURE: 96.26 F | DIASTOLIC BLOOD PRESSURE: 66 MMHG | RESPIRATION RATE: 14 BRPM | HEART RATE: 76 BPM

## 2024-04-09 VITALS
SYSTOLIC BLOOD PRESSURE: 119 MMHG | OXYGEN SATURATION: 100 % | RESPIRATION RATE: 13 BRPM | DIASTOLIC BLOOD PRESSURE: 76 MMHG | HEART RATE: 75 BPM

## 2024-04-09 VITALS
DIASTOLIC BLOOD PRESSURE: 62 MMHG | RESPIRATION RATE: 16 BRPM | OXYGEN SATURATION: 99 % | HEART RATE: 75 BPM | SYSTOLIC BLOOD PRESSURE: 118 MMHG

## 2024-04-09 VITALS
DIASTOLIC BLOOD PRESSURE: 78 MMHG | SYSTOLIC BLOOD PRESSURE: 134 MMHG | RESPIRATION RATE: 19 BRPM | HEART RATE: 76 BPM | OXYGEN SATURATION: 97 %

## 2024-04-09 VITALS
RESPIRATION RATE: 15 BRPM | SYSTOLIC BLOOD PRESSURE: 113 MMHG | HEART RATE: 73 BPM | OXYGEN SATURATION: 97 % | DIASTOLIC BLOOD PRESSURE: 78 MMHG

## 2024-04-09 VITALS
RESPIRATION RATE: 16 BRPM | SYSTOLIC BLOOD PRESSURE: 128 MMHG | HEART RATE: 69 BPM | OXYGEN SATURATION: 99 % | DIASTOLIC BLOOD PRESSURE: 87 MMHG

## 2024-04-09 VITALS
OXYGEN SATURATION: 99 % | DIASTOLIC BLOOD PRESSURE: 77 MMHG | RESPIRATION RATE: 12 BRPM | HEART RATE: 69 BPM | SYSTOLIC BLOOD PRESSURE: 112 MMHG

## 2024-04-09 VITALS
HEART RATE: 76 BPM | SYSTOLIC BLOOD PRESSURE: 120 MMHG | OXYGEN SATURATION: 98 % | DIASTOLIC BLOOD PRESSURE: 79 MMHG | RESPIRATION RATE: 20 BRPM

## 2024-04-09 DIAGNOSIS — M47.24: ICD-10-CM

## 2024-04-09 DIAGNOSIS — M51.14: ICD-10-CM

## 2024-04-09 DIAGNOSIS — M48.04: Primary | ICD-10-CM

## 2024-04-09 PROCEDURE — 99152 MOD SED SAME PHYS/QHP 5/>YRS: CPT

## 2024-04-09 PROCEDURE — 64479 NJX AA&/STRD TFRM EPI C/T 1: CPT

## 2024-04-09 RX ADMIN — DEXAMETHASONE SODIUM PHOSPHATE 20 MG: 10 INJECTION INTRAMUSCULAR; INTRAVENOUS at 10:26

## 2024-04-09 RX ADMIN — MIDAZOLAM HYDROCHLORIDE 2 MG: 1 INJECTION, SOLUTION INTRAMUSCULAR; INTRAVENOUS at 10:22

## 2024-04-09 RX ADMIN — BUPIVACAINE HYDROCHLORIDE 2 ML: 2.5 INJECTION, SOLUTION EPIDURAL; INFILTRATION; INTRACAUDAL; PERINEURAL at 10:26

## 2024-05-13 ENCOUNTER — HOSPITAL ENCOUNTER (OUTPATIENT)
Age: 64
End: 2024-05-13
Payer: OTHER GOVERNMENT

## 2024-05-13 VITALS — BODY MASS INDEX: 31.3 KG/M2

## 2024-05-13 DIAGNOSIS — M45.9: ICD-10-CM

## 2024-05-13 DIAGNOSIS — M47.819: Primary | ICD-10-CM

## 2024-05-13 LAB
ADD MANUAL DIFF / SLIDE REVIEW: NO
ALBUMIN SERPL-MCNC: 4.3 G/DL (ref 3.5–5)
ALBUMIN/GLOB SERPL: 1.5 {RATIO} (ref 1–2.8)
ALP SERPL-CCNC: 51 U/L (ref 38–126)
ALT SERPL-CCNC: 13 IU/L (ref ?–35)
BUN SERPL-MCNC: 9 MG/DL (ref 7–17)
CALCIUM SERPL-MCNC: 8.9 MG/DL (ref 8.4–10.2)
CHLORIDE SERPL-SCNC: 108 MMOL/L (ref 98–107)
CO2 SERPL-SCNC: 27 MMOL/L (ref 22–32)
ESTIMATED GLOMERULAR FILT RATE: > 60 ML/MIN (ref 60–?)
GLOBULIN SER CALC-MCNC: 2.9 G/DL (ref 1.7–4.1)
GLUCOSE SERPL-MCNC: 91 MG/DL (ref 80–110)
HEMATOCRIT: 41.1 % (ref 36–46)
HEMOGLOBIN: 14.1 G/DL (ref 12–16)
HEMOLYSIS: < 15 (ref 0–50)
LYMPHOCYTES # SPEC AUTO: 1800 /UL (ref 1100–4500)
MCV RBC: 90.5 FL (ref 80–100)
MEAN CORPUSCULAR HEMOGLOBIN: 31 PG (ref 26–34)
MEAN CORPUSCULAR HGB CONC: 34.2 % (ref 30–36)
PLATELET COUNT: 182 X10^3/UL (ref 150–400)
POTASSIUM SERPL-SCNC: 4 MMOL/L (ref 3.4–5.1)
PROT SERPL-MCNC: 7.2 G/DL (ref 6.3–8.2)
SODIUM SERPL-SCNC: 140 MMOL/L (ref 137–145)

## 2024-05-13 PROCEDURE — 85651 RBC SED RATE NONAUTOMATED: CPT

## 2024-05-13 PROCEDURE — 85025 COMPLETE CBC W/AUTO DIFF WBC: CPT

## 2024-05-13 PROCEDURE — 86140 C-REACTIVE PROTEIN: CPT

## 2024-05-13 PROCEDURE — 80053 COMPREHEN METABOLIC PANEL: CPT

## 2024-05-13 PROCEDURE — 36415 COLL VENOUS BLD VENIPUNCTURE: CPT

## 2024-05-15 ENCOUNTER — HOSPITAL ENCOUNTER (OUTPATIENT)
Dept: HOSPITAL 73 - PHYS | Age: 64
LOS: 6 days | Discharge: HOME | End: 2024-05-21
Payer: OTHER GOVERNMENT

## 2024-05-15 VITALS — BODY MASS INDEX: 31.3 KG/M2

## 2024-05-15 DIAGNOSIS — M54.41: Primary | ICD-10-CM

## 2024-05-15 DIAGNOSIS — M79.606: ICD-10-CM

## 2024-05-15 DIAGNOSIS — M54.9: ICD-10-CM

## 2024-05-15 PROCEDURE — 97140 MANUAL THERAPY 1/> REGIONS: CPT

## 2024-05-15 PROCEDURE — 95992 CANALITH REPOSITIONING PROC: CPT

## 2024-05-15 PROCEDURE — 97161 PT EVAL LOW COMPLEX 20 MIN: CPT

## 2024-05-15 PROCEDURE — 97535 SELF CARE MNGMENT TRAINING: CPT

## 2024-05-15 PROCEDURE — 97112 NEUROMUSCULAR REEDUCATION: CPT

## 2024-05-15 PROCEDURE — 97110 THERAPEUTIC EXERCISES: CPT

## 2024-05-15 PROCEDURE — 97010 HOT OR COLD PACKS THERAPY: CPT

## 2024-05-15 PROCEDURE — 97530 THERAPEUTIC ACTIVITIES: CPT

## 2024-08-30 ENCOUNTER — OFFICE VISIT (OUTPATIENT)
Dept: URGENT CARE | Facility: PHYSICIAN GROUP | Age: 64
End: 2024-08-30
Payer: OTHER GOVERNMENT

## 2024-08-30 VITALS
BODY MASS INDEX: 30.24 KG/M2 | SYSTOLIC BLOOD PRESSURE: 138 MMHG | HEART RATE: 78 BPM | DIASTOLIC BLOOD PRESSURE: 88 MMHG | RESPIRATION RATE: 16 BRPM | WEIGHT: 150 LBS | OXYGEN SATURATION: 95 % | HEIGHT: 59 IN | TEMPERATURE: 97.4 F

## 2024-08-30 DIAGNOSIS — M54.50 LUMBAR BACK PAIN: ICD-10-CM
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
## 2024-08-30 DIAGNOSIS — M51.37 DDD (DEGENERATIVE DISC DISEASE), LUMBOSACRAL: ICD-10-CM

## 2024-08-30 DIAGNOSIS — M62.830 LUMBAR PARASPINAL MUSCLE SPASM: ICD-10-CM

## 2024-08-30 DIAGNOSIS — M43.16 ANTEROLISTHESIS OF LUMBAR SPINE: ICD-10-CM

## 2024-08-30 RX ORDER — ESTRADIOL 0.05 MG/D
PATCH, EXTENDED RELEASE TRANSDERMAL
COMMUNITY
Start: 2024-04-09

## 2024-08-30 RX ORDER — DESOXIMETASONE 0.5 MG/G
OINTMENT TOPICAL
COMMUNITY
Start: 2024-05-28

## 2024-08-30 RX ORDER — HYDROXYCHLOROQUINE SULFATE 200 MG/1
400 TABLET, FILM COATED ORAL DAILY
COMMUNITY
Start: 2024-08-19

## 2024-08-30 RX ORDER — DULOXETIN HYDROCHLORIDE 60 MG/1
CAPSULE, DELAYED RELEASE ORAL
COMMUNITY
Start: 2024-08-15

## 2024-08-30 RX ORDER — KETOROLAC TROMETHAMINE 15 MG/ML
15 INJECTION, SOLUTION INTRAMUSCULAR; INTRAVENOUS ONCE
Status: COMPLETED | OUTPATIENT
Start: 2024-08-30 | End: 2024-08-30

## 2024-08-30 RX ORDER — NIFEDIPINE 10 MG/1
10 CAPSULE ORAL
COMMUNITY
Start: 2024-02-14 | End: 2025-02-13

## 2024-08-30 RX ORDER — LEFLUNOMIDE 20 MG/1
TABLET ORAL
COMMUNITY
Start: 2024-05-15

## 2024-08-30 RX ORDER — TACROLIMUS 1 MG/G
OINTMENT TOPICAL
COMMUNITY
Start: 2024-05-14

## 2024-08-30 RX ORDER — TRAMADOL HYDROCHLORIDE 50 MG/1
50 TABLET ORAL
COMMUNITY

## 2024-08-30 RX ORDER — BACLOFEN 10 MG/1
TABLET ORAL
COMMUNITY
Start: 2024-06-06 | End: 2024-08-30

## 2024-08-30 RX ORDER — CYCLOBENZAPRINE HCL 10 MG
TABLET ORAL
COMMUNITY
Start: 2024-08-15 | End: 2024-08-30

## 2024-08-30 RX ORDER — GABAPENTIN 300 MG/1
1 CAPSULE ORAL 3 TIMES DAILY
COMMUNITY
Start: 2024-06-13

## 2024-08-30 RX ORDER — KETOROLAC TROMETHAMINE 30 MG/ML
30 INJECTION, SOLUTION INTRAMUSCULAR; INTRAVENOUS ONCE
Status: DISCONTINUED | OUTPATIENT
Start: 2024-08-30 | End: 2024-08-30

## 2024-08-30 RX ORDER — KETOROLAC TROMETHAMINE 30 MG/ML
15 INJECTION, SOLUTION INTRAMUSCULAR; INTRAVENOUS ONCE
Status: DISCONTINUED | OUTPATIENT
Start: 2024-08-30 | End: 2024-08-30

## 2024-08-30 RX ORDER — MECLIZINE HYDROCHLORIDE 25 MG/1
TABLET ORAL
COMMUNITY
Start: 2024-03-19

## 2024-08-30 RX ORDER — LEFLUNOMIDE 10 MG/1
10 TABLET ORAL DAILY
COMMUNITY
Start: 2024-08-19

## 2024-08-30 RX ORDER — LIDOCAINE 50 MG/G
PATCH TOPICAL
COMMUNITY
Start: 2024-08-15

## 2024-08-30 RX ORDER — ADALIMUMAB 40MG/0.4ML
40 KIT SUBCUTANEOUS
COMMUNITY
Start: 2024-05-15

## 2024-08-30 RX ADMIN — KETOROLAC TROMETHAMINE 15 MG: 15 INJECTION, SOLUTION INTRAMUSCULAR; INTRAVENOUS at 10:56

## 2024-08-30 NOTE — PROGRESS NOTES
Subjective:   Chapis Bruce is a 64 y.o. female who presents for Back Injury (Wed Slipped and hurt back, lower back pain, B feet swelling, Mostly on R side going down R leg )       HPI  Patient is a pleasant 64 y.o. who presents for evaluation of 2-day history of right lumbar pain, with radiation to right anterior thigh, status post fall.  Patient reports that she was on a cruise ship, was walking down steep stairs when she lost her footing and had to land/somewhat jump down the last 3 stairs, landing on her right leg initially than the left.  Felt immediate low back pain/cramping located on the right side of her back.  Was able to finish the tour on the cruise ship.  Denies cauda equina symptoms.     ROS  All other systems are negative except as documented above within HPI.    MEDS:   Current Outpatient Medications:     HUMIRA, 2 PEN, 40 MG/0.4ML Pen-injector Kit, Inject 40 mg under the skin., Disp: , Rfl:     baclofen (LIORESAL) 10 MG Tab, , Disp: , Rfl:     cyclobenzaprine (FLEXERIL) 10 mg Tab, , Disp: , Rfl:     Desoximetasone 0.05 % Ointment, , Disp: , Rfl:     DULoxetine (CYMBALTA) 60 MG Cap DR Particles delayed-release capsule, , Disp: , Rfl:     estradiol (VIVELLE DOT) 0.05 MG/24HR PATCH BIWEEKLY, , Disp: , Rfl:     gabapentin (NEURONTIN) 300 MG Cap, Take 1 Capsule by mouth 3 times a day., Disp: , Rfl:     hydroxychloroquine (PLAQUENIL) 200 MG Tab, Take 400 mg by mouth every day., Disp: , Rfl:     leflunomide (ARAVA) 10 MG Tab, Take 10 mg by mouth every day., Disp: , Rfl:     leflunomide (ARAVA) 20 MG Tab, , Disp: , Rfl:     lidocaine (LIDODERM) 5 % Patch, , Disp: , Rfl:     meclizine (ANTIVERT) 25 MG Tab, , Disp: , Rfl:     NIFEdipine IR (PROCARDIA) 10 MG Cap, Take 10 mg by mouth., Disp: , Rfl:     tacrolimus (PROTOPIC) 0.1 % Ointment, Apply topically twice daily as needed for lichen sclerosus, Disp: , Rfl:     traMADol (ULTRAM) 50 MG Tab, Take 50 mg by mouth., Disp: , Rfl:   ALLERGIES: Not on  "File    Patient's PMH, SocHx, SurgHx, FamHx, Drug allergies and medications were reviewed.     Objective:   /88   Pulse 78   Temp 36.3 °C (97.4 °F) (Temporal)   Resp 16   Ht 1.492 m (4' 10.75\")   Wt 68 kg (150 lb)   SpO2 95%   BMI 30.55 kg/m²     Physical Exam  Vitals and nursing note reviewed.   Constitutional:       General: She is awake.      Appearance: Normal appearance. She is well-developed.   HENT:      Head: Normocephalic and atraumatic.      Right Ear: External ear normal.      Left Ear: External ear normal.      Nose: Nose normal.      Mouth/Throat:      Mouth: Mucous membranes are moist.      Pharynx: Oropharynx is clear.   Eyes:      Extraocular Movements: Extraocular movements intact.      Conjunctiva/sclera: Conjunctivae normal.   Cardiovascular:      Rate and Rhythm: Normal rate and regular rhythm.   Pulmonary:      Effort: Pulmonary effort is normal.      Breath sounds: Normal breath sounds.   Musculoskeletal:         General: Normal range of motion.      Cervical back: Normal range of motion and neck supple.        Back:    Skin:     General: Skin is warm and dry.   Neurological:      Mental Status: She is alert and oriented to person, place, and time.   Psychiatric:         Mood and Affect: Mood normal.         Behavior: Behavior normal.         Thought Content: Thought content normal.         Assessment/Plan:   Assessment    1. Lumbar paraspinal muscle spasm  - tizanidine (ZANAFLEX) 4 MG Tab; Take 1 Tablet by mouth every 6 hours as needed (muscle spams/pain).  Dispense: 30 Tablet; Refill: 0  - DX-LUMBAR SPINE-2 OR 3 VIEWS; Future  - ketorolac (Toradol) 15 MG/ML injection 15 mg    2. Ground-level fall  - ketorolac (Toradol) 15 MG/ML injection 15 mg    3. Lumbar back pain  - ketorolac (Toradol) 15 MG/ML injection 15 mg    4. Anterolisthesis of lumbar spine    5. DDD (degenerative disc disease), lumbosacral      Vital signs stable at today's acute urgent care visit.  Toradol given in " clinic with significant relief.  Patient had x-ray which was reviewed post visit, which does not show acute compression fracture.  Incidental degenerative disc disease, bar anterolisthesis, and gallstones noted.  Discussed with patient at length via phone post visit.  She will follow-up with primary care once returning home to [place].  Begin medications as listed. R    Advised the patient to follow-up with the primary care provider if symptoms persist.  Red flags discussed and indications to immediately call 911 or present to the ED. All questions were encouraged and answered to the patient's satisfaction and understanding, and they agree to the plan of care.     This is an acute problem with uncertain prognosis, medication management and instructions as well as management options were provided.  I personally reviewed prior external notes and test results pertinent to today and independently reviewed and interpreted all diagnostics, to include POCT testing. Time spent evaluating this patient includes preparing for visit, counseling/education, exam, evaluation, obtaining history, and ordering lab/test/procedures.      Please note that this dictation was created using voice recognition software. I have made a reasonable attempt to correct obvious errors, but I expect that there are errors of grammar and possibly content that I did not discover before finalizing the note.

## 2024-09-03 DIAGNOSIS — M62.830 LUMBAR PARASPINAL MUSCLE SPASM: ICD-10-CM

## 2024-09-05 ENCOUNTER — HOSPITAL ENCOUNTER (OUTPATIENT)
Dept: HOSPITAL 73 - MAMMO | Age: 64
End: 2024-09-05
Payer: OTHER GOVERNMENT

## 2024-09-05 ENCOUNTER — HOSPITAL ENCOUNTER (OUTPATIENT)
Dept: HOSPITAL 73 - RAD | Age: 64
Discharge: HOME | End: 2024-09-05
Payer: OTHER GOVERNMENT

## 2024-09-05 VITALS
OXYGEN SATURATION: 96 % | SYSTOLIC BLOOD PRESSURE: 131 MMHG | RESPIRATION RATE: 16 BRPM | DIASTOLIC BLOOD PRESSURE: 76 MMHG | TEMPERATURE: 97.88 F | HEART RATE: 80 BPM

## 2024-09-05 VITALS
DIASTOLIC BLOOD PRESSURE: 72 MMHG | HEART RATE: 77 BPM | SYSTOLIC BLOOD PRESSURE: 157 MMHG | OXYGEN SATURATION: 98 % | RESPIRATION RATE: 14 BRPM

## 2024-09-05 VITALS
DIASTOLIC BLOOD PRESSURE: 81 MMHG | HEART RATE: 79 BPM | RESPIRATION RATE: 15 BRPM | OXYGEN SATURATION: 97 % | SYSTOLIC BLOOD PRESSURE: 167 MMHG

## 2024-09-05 VITALS
RESPIRATION RATE: 14 BRPM | DIASTOLIC BLOOD PRESSURE: 81 MMHG | SYSTOLIC BLOOD PRESSURE: 154 MMHG | HEART RATE: 78 BPM | OXYGEN SATURATION: 99 %

## 2024-09-05 VITALS
SYSTOLIC BLOOD PRESSURE: 141 MMHG | OXYGEN SATURATION: 99 % | DIASTOLIC BLOOD PRESSURE: 86 MMHG | HEART RATE: 78 BPM | RESPIRATION RATE: 14 BRPM

## 2024-09-05 VITALS — BODY MASS INDEX: 31.3 KG/M2

## 2024-09-05 VITALS
HEART RATE: 78 BPM | OXYGEN SATURATION: 99 % | SYSTOLIC BLOOD PRESSURE: 157 MMHG | DIASTOLIC BLOOD PRESSURE: 97 MMHG | RESPIRATION RATE: 15 BRPM

## 2024-09-05 DIAGNOSIS — Z80.3: ICD-10-CM

## 2024-09-05 DIAGNOSIS — Z12.31: Primary | ICD-10-CM

## 2024-09-05 DIAGNOSIS — R92.323: ICD-10-CM

## 2024-09-05 DIAGNOSIS — M51.16: Primary | ICD-10-CM

## 2024-09-05 DIAGNOSIS — R92.1: ICD-10-CM

## 2024-09-05 PROCEDURE — 77063 BREAST TOMOSYNTHESIS BI: CPT

## 2024-09-05 PROCEDURE — 77067 SCR MAMMO BI INCL CAD: CPT

## 2024-09-05 PROCEDURE — 64483 NJX AA&/STRD TFRM EPI L/S 1: CPT

## 2024-09-05 RX ADMIN — BETAMETHASONE SODIUM PHOSPHATE AND BETAMETHASONE ACETATE 6 MG: 3; 3 INJECTION, SUSPENSION INTRA-ARTICULAR; INTRALESIONAL; INTRAMUSCULAR at 13:32

## 2024-09-05 RX ADMIN — BUPIVACAINE HYDROCHLORIDE 2 ML: 2.5 INJECTION, SOLUTION EPIDURAL; INFILTRATION; INTRACAUDAL; PERINEURAL at 13:32

## 2024-09-05 RX ADMIN — DEXAMETHASONE SODIUM PHOSPHATE 10 MG: 10 INJECTION INTRAMUSCULAR; INTRAVENOUS at 13:32

## 2024-09-05 RX ADMIN — BETAMETHASONE SODIUM PHOSPHATE AND BETAMETHASONE ACETATE 6 MG: 3; 3 INJECTION, SUSPENSION INTRA-ARTICULAR; INTRALESIONAL; INTRAMUSCULAR at 13:33

## 2024-10-01 ENCOUNTER — HOSPITAL ENCOUNTER (OUTPATIENT)
Age: 64
End: 2024-10-01
Payer: OTHER GOVERNMENT

## 2024-10-01 VITALS — BODY MASS INDEX: 31.3 KG/M2

## 2024-10-01 DIAGNOSIS — K80.20: Primary | ICD-10-CM

## 2024-10-01 PROCEDURE — 76705 ECHO EXAM OF ABDOMEN: CPT

## 2024-10-29 ENCOUNTER — HOSPITAL ENCOUNTER (OUTPATIENT)
Age: 64
Discharge: HOME | End: 2024-10-29
Payer: OTHER GOVERNMENT

## 2024-10-29 VITALS
DIASTOLIC BLOOD PRESSURE: 93 MMHG | RESPIRATION RATE: 12 BRPM | TEMPERATURE: 97 F | HEART RATE: 63 BPM | OXYGEN SATURATION: 99 % | SYSTOLIC BLOOD PRESSURE: 158 MMHG

## 2024-10-29 VITALS
OXYGEN SATURATION: 95 % | DIASTOLIC BLOOD PRESSURE: 72 MMHG | TEMPERATURE: 96.98 F | HEART RATE: 63 BPM | SYSTOLIC BLOOD PRESSURE: 117 MMHG | RESPIRATION RATE: 12 BRPM

## 2024-10-29 VITALS
DIASTOLIC BLOOD PRESSURE: 78 MMHG | OXYGEN SATURATION: 98 % | HEART RATE: 63 BPM | SYSTOLIC BLOOD PRESSURE: 125 MMHG | TEMPERATURE: 96.98 F | RESPIRATION RATE: 14 BRPM

## 2024-10-29 VITALS
SYSTOLIC BLOOD PRESSURE: 131 MMHG | RESPIRATION RATE: 15 BRPM | HEART RATE: 63 BPM | OXYGEN SATURATION: 96 % | DIASTOLIC BLOOD PRESSURE: 78 MMHG | TEMPERATURE: 97.52 F

## 2024-10-29 VITALS
RESPIRATION RATE: 15 BRPM | TEMPERATURE: 97.16 F | OXYGEN SATURATION: 94 % | HEART RATE: 63 BPM | DIASTOLIC BLOOD PRESSURE: 71 MMHG | SYSTOLIC BLOOD PRESSURE: 112 MMHG

## 2024-10-29 VITALS
HEART RATE: 75 BPM | TEMPERATURE: 97.52 F | DIASTOLIC BLOOD PRESSURE: 87 MMHG | OXYGEN SATURATION: 99 % | SYSTOLIC BLOOD PRESSURE: 142 MMHG | RESPIRATION RATE: 16 BRPM

## 2024-10-29 VITALS
OXYGEN SATURATION: 94 % | DIASTOLIC BLOOD PRESSURE: 72 MMHG | SYSTOLIC BLOOD PRESSURE: 117 MMHG | HEART RATE: 63 BPM | TEMPERATURE: 97 F | RESPIRATION RATE: 13 BRPM

## 2024-10-29 VITALS
SYSTOLIC BLOOD PRESSURE: 149 MMHG | RESPIRATION RATE: 14 BRPM | TEMPERATURE: 96.98 F | DIASTOLIC BLOOD PRESSURE: 83 MMHG | OXYGEN SATURATION: 99 % | HEART RATE: 62 BPM

## 2024-10-29 VITALS
OXYGEN SATURATION: 98 % | TEMPERATURE: 97.16 F | HEART RATE: 75 BPM | RESPIRATION RATE: 16 BRPM | DIASTOLIC BLOOD PRESSURE: 87 MMHG | SYSTOLIC BLOOD PRESSURE: 142 MMHG

## 2024-10-29 VITALS — BODY MASS INDEX: 33.7 KG/M2 | BODY MASS INDEX: 32.3 KG/M2 | BODY MASS INDEX: 31.3 KG/M2

## 2024-10-29 DIAGNOSIS — K80.10: Primary | ICD-10-CM

## 2024-10-29 PROCEDURE — 0FT44ZZ RESECTION OF GALLBLADDER, PERCUTANEOUS ENDOSCOPIC APPROACH: ICD-10-PCS | Performed by: SURGERY

## 2024-10-29 PROCEDURE — 47562 LAPAROSCOPIC CHOLECYSTECTOMY: CPT

## 2024-10-29 PROCEDURE — 71045 X-RAY EXAM CHEST 1 VIEW: CPT

## 2024-10-29 PROCEDURE — 93005 ELECTROCARDIOGRAM TRACING: CPT

## 2024-12-08 ENCOUNTER — HOSPITAL ENCOUNTER (OUTPATIENT)
Dept: HOSPITAL 73 - RAD | Age: 64
End: 2024-12-08
Payer: OTHER GOVERNMENT

## 2024-12-08 VITALS — BODY MASS INDEX: 31.3 KG/M2

## 2024-12-08 DIAGNOSIS — M25.531: Primary | ICD-10-CM

## 2024-12-08 PROCEDURE — 73110 X-RAY EXAM OF WRIST: CPT

## 2025-01-23 ENCOUNTER — HOSPITAL ENCOUNTER (OUTPATIENT)
Dept: HOSPITAL 73 - RAD | Age: 65
Discharge: HOME | End: 2025-01-23
Payer: OTHER GOVERNMENT

## 2025-01-23 VITALS
HEART RATE: 94 BPM | OXYGEN SATURATION: 98 % | DIASTOLIC BLOOD PRESSURE: 85 MMHG | SYSTOLIC BLOOD PRESSURE: 131 MMHG | RESPIRATION RATE: 15 BRPM

## 2025-01-23 VITALS — BODY MASS INDEX: 31.3 KG/M2

## 2025-01-23 VITALS
HEART RATE: 98 BPM | RESPIRATION RATE: 17 BRPM | OXYGEN SATURATION: 99 % | SYSTOLIC BLOOD PRESSURE: 142 MMHG | DIASTOLIC BLOOD PRESSURE: 83 MMHG

## 2025-01-23 VITALS
DIASTOLIC BLOOD PRESSURE: 86 MMHG | OXYGEN SATURATION: 98 % | SYSTOLIC BLOOD PRESSURE: 130 MMHG | RESPIRATION RATE: 16 BRPM | HEART RATE: 85 BPM

## 2025-01-23 VITALS
DIASTOLIC BLOOD PRESSURE: 74 MMHG | HEART RATE: 87 BPM | RESPIRATION RATE: 16 BRPM | SYSTOLIC BLOOD PRESSURE: 127 MMHG | OXYGEN SATURATION: 98 %

## 2025-01-23 VITALS
HEART RATE: 87 BPM | OXYGEN SATURATION: 98 % | DIASTOLIC BLOOD PRESSURE: 86 MMHG | SYSTOLIC BLOOD PRESSURE: 129 MMHG | RESPIRATION RATE: 18 BRPM

## 2025-01-23 VITALS
RESPIRATION RATE: 14 BRPM | DIASTOLIC BLOOD PRESSURE: 82 MMHG | HEART RATE: 92 BPM | SYSTOLIC BLOOD PRESSURE: 127 MMHG | OXYGEN SATURATION: 97 %

## 2025-01-23 VITALS
OXYGEN SATURATION: 97 % | TEMPERATURE: 96.62 F | SYSTOLIC BLOOD PRESSURE: 141 MMHG | RESPIRATION RATE: 16 BRPM | DIASTOLIC BLOOD PRESSURE: 88 MMHG | HEART RATE: 94 BPM

## 2025-01-23 VITALS
DIASTOLIC BLOOD PRESSURE: 82 MMHG | HEART RATE: 91 BPM | SYSTOLIC BLOOD PRESSURE: 123 MMHG | RESPIRATION RATE: 17 BRPM | OXYGEN SATURATION: 98 %

## 2025-01-23 VITALS
RESPIRATION RATE: 14 BRPM | OXYGEN SATURATION: 98 % | HEART RATE: 91 BPM | DIASTOLIC BLOOD PRESSURE: 82 MMHG | SYSTOLIC BLOOD PRESSURE: 132 MMHG

## 2025-01-23 DIAGNOSIS — M48.04: Primary | ICD-10-CM

## 2025-01-23 DIAGNOSIS — M51.14: ICD-10-CM

## 2025-01-23 DIAGNOSIS — M47.24: ICD-10-CM

## 2025-01-23 PROCEDURE — 99152 MOD SED SAME PHYS/QHP 5/>YRS: CPT

## 2025-01-23 PROCEDURE — 64479 NJX AA&/STRD TFRM EPI C/T 1: CPT

## 2025-01-23 RX ADMIN — MIDAZOLAM HYDROCHLORIDE 2 MG: 1 INJECTION, SOLUTION INTRAMUSCULAR; INTRAVENOUS at 10:09

## 2025-01-23 RX ADMIN — DEXAMETHASONE SODIUM PHOSPHATE 20 MG: 10 INJECTION INTRAMUSCULAR; INTRAVENOUS at 10:12

## 2025-01-23 RX ADMIN — BUPIVACAINE HYDROCHLORIDE 2 ML: 2.5 INJECTION, SOLUTION EPIDURAL; INFILTRATION; INTRACAUDAL; PERINEURAL at 10:12

## 2025-01-30 ENCOUNTER — HOSPITAL ENCOUNTER (OUTPATIENT)
Age: 65
Discharge: HOME | End: 2025-01-30
Payer: OTHER GOVERNMENT

## 2025-01-30 VITALS
SYSTOLIC BLOOD PRESSURE: 142 MMHG | OXYGEN SATURATION: 99 % | DIASTOLIC BLOOD PRESSURE: 94 MMHG | HEART RATE: 87 BPM | RESPIRATION RATE: 16 BRPM | TEMPERATURE: 96.62 F

## 2025-01-30 VITALS
HEART RATE: 80 BPM | DIASTOLIC BLOOD PRESSURE: 84 MMHG | RESPIRATION RATE: 14 BRPM | OXYGEN SATURATION: 98 % | SYSTOLIC BLOOD PRESSURE: 134 MMHG

## 2025-01-30 VITALS
OXYGEN SATURATION: 97 % | HEART RATE: 80 BPM | RESPIRATION RATE: 12 BRPM | DIASTOLIC BLOOD PRESSURE: 87 MMHG | SYSTOLIC BLOOD PRESSURE: 118 MMHG | TEMPERATURE: 96.9 F

## 2025-01-30 VITALS
OXYGEN SATURATION: 94 % | DIASTOLIC BLOOD PRESSURE: 69 MMHG | TEMPERATURE: 96.62 F | RESPIRATION RATE: 16 BRPM | HEART RATE: 79 BPM | SYSTOLIC BLOOD PRESSURE: 100 MMHG

## 2025-01-30 VITALS
SYSTOLIC BLOOD PRESSURE: 117 MMHG | RESPIRATION RATE: 14 BRPM | HEART RATE: 75 BPM | OXYGEN SATURATION: 98 % | DIASTOLIC BLOOD PRESSURE: 71 MMHG

## 2025-01-30 VITALS — BODY MASS INDEX: 31.3 KG/M2

## 2025-01-30 DIAGNOSIS — R13.19: ICD-10-CM

## 2025-01-30 DIAGNOSIS — K44.9: Primary | ICD-10-CM

## 2025-01-30 DIAGNOSIS — Z98.84: ICD-10-CM

## 2025-01-30 DIAGNOSIS — Z90.710: ICD-10-CM

## 2025-01-30 PROCEDURE — 43235 EGD DIAGNOSTIC BRUSH WASH: CPT

## 2025-01-30 PROCEDURE — 0DJ08ZZ INSPECTION OF UPPER INTESTINAL TRACT, VIA NATURAL OR ARTIFICIAL OPENING ENDOSCOPIC: ICD-10-PCS | Performed by: SURGERY

## 2025-01-31 ENCOUNTER — HOSPITAL ENCOUNTER (OUTPATIENT)
Dept: HOSPITAL 73 - RAD | Age: 65
End: 2025-01-31
Payer: OTHER GOVERNMENT

## 2025-01-31 VITALS — BODY MASS INDEX: 31.3 KG/M2

## 2025-01-31 DIAGNOSIS — R13.19: Primary | ICD-10-CM

## 2025-01-31 DIAGNOSIS — K46.9: ICD-10-CM

## 2025-01-31 DIAGNOSIS — K21.9: ICD-10-CM

## 2025-01-31 PROCEDURE — 74220 X-RAY XM ESOPHAGUS 1CNTRST: CPT

## 2025-03-04 ENCOUNTER — HOSPITAL ENCOUNTER (OUTPATIENT)
Age: 65
End: 2025-03-04
Payer: OTHER GOVERNMENT

## 2025-03-04 VITALS — BODY MASS INDEX: 31.3 KG/M2

## 2025-03-04 DIAGNOSIS — J02.9: Primary | ICD-10-CM

## 2025-03-04 LAB
FLUAV RNA UPPER RESP QL NAA+PROBE: (no result)
FLUBV RNA UPPER RESP QL NAA+PROBE: (no result)

## 2025-03-04 PROCEDURE — 0241U: CPT

## 2025-03-04 PROCEDURE — 87070 CULTURE OTHR SPECIMN AEROBIC: CPT

## 2025-04-27 ENCOUNTER — HOSPITAL ENCOUNTER (EMERGENCY)
Age: 65
Discharge: HOME | End: 2025-04-27
Payer: OTHER GOVERNMENT

## 2025-04-27 VITALS
RESPIRATION RATE: 27 BRPM | OXYGEN SATURATION: 100 % | SYSTOLIC BLOOD PRESSURE: 186 MMHG | DIASTOLIC BLOOD PRESSURE: 85 MMHG | HEART RATE: 77 BPM

## 2025-04-27 VITALS
SYSTOLIC BLOOD PRESSURE: 107 MMHG | OXYGEN SATURATION: 99 % | RESPIRATION RATE: 20 BRPM | HEART RATE: 82 BPM | DIASTOLIC BLOOD PRESSURE: 69 MMHG

## 2025-04-27 VITALS
SYSTOLIC BLOOD PRESSURE: 129 MMHG | DIASTOLIC BLOOD PRESSURE: 76 MMHG | OXYGEN SATURATION: 95 % | HEART RATE: 71 BPM | RESPIRATION RATE: 10 BRPM

## 2025-04-27 VITALS
SYSTOLIC BLOOD PRESSURE: 139 MMHG | OXYGEN SATURATION: 100 % | HEART RATE: 75 BPM | DIASTOLIC BLOOD PRESSURE: 80 MMHG | RESPIRATION RATE: 25 BRPM

## 2025-04-27 VITALS
DIASTOLIC BLOOD PRESSURE: 93 MMHG | SYSTOLIC BLOOD PRESSURE: 144 MMHG | OXYGEN SATURATION: 96 % | RESPIRATION RATE: 18 BRPM | TEMPERATURE: 97.8 F | HEART RATE: 88 BPM

## 2025-04-27 VITALS — RESPIRATION RATE: 14 BRPM | HEART RATE: 79 BPM | OXYGEN SATURATION: 99 %

## 2025-04-27 VITALS
DIASTOLIC BLOOD PRESSURE: 75 MMHG | SYSTOLIC BLOOD PRESSURE: 115 MMHG | RESPIRATION RATE: 29 BRPM | OXYGEN SATURATION: 98 % | HEART RATE: 77 BPM

## 2025-04-27 VITALS
DIASTOLIC BLOOD PRESSURE: 86 MMHG | OXYGEN SATURATION: 98 % | HEART RATE: 72 BPM | SYSTOLIC BLOOD PRESSURE: 150 MMHG | RESPIRATION RATE: 13 BRPM

## 2025-04-27 VITALS — BODY MASS INDEX: 29.2 KG/M2 | BODY MASS INDEX: 31.3 KG/M2

## 2025-04-27 VITALS
HEART RATE: 79 BPM | RESPIRATION RATE: 18 BRPM | OXYGEN SATURATION: 98 % | DIASTOLIC BLOOD PRESSURE: 89 MMHG | SYSTOLIC BLOOD PRESSURE: 136 MMHG

## 2025-04-27 VITALS
SYSTOLIC BLOOD PRESSURE: 119 MMHG | DIASTOLIC BLOOD PRESSURE: 86 MMHG | OXYGEN SATURATION: 97 % | RESPIRATION RATE: 16 BRPM | HEART RATE: 85 BPM

## 2025-04-27 VITALS
HEART RATE: 79 BPM | OXYGEN SATURATION: 100 % | SYSTOLIC BLOOD PRESSURE: 175 MMHG | RESPIRATION RATE: 22 BRPM | DIASTOLIC BLOOD PRESSURE: 90 MMHG

## 2025-04-27 VITALS
OXYGEN SATURATION: 97 % | HEART RATE: 77 BPM | RESPIRATION RATE: 21 BRPM | DIASTOLIC BLOOD PRESSURE: 62 MMHG | SYSTOLIC BLOOD PRESSURE: 104 MMHG

## 2025-04-27 VITALS
OXYGEN SATURATION: 100 % | SYSTOLIC BLOOD PRESSURE: 137 MMHG | HEART RATE: 70 BPM | DIASTOLIC BLOOD PRESSURE: 82 MMHG | RESPIRATION RATE: 35 BRPM

## 2025-04-27 VITALS
HEART RATE: 75 BPM | SYSTOLIC BLOOD PRESSURE: 99 MMHG | RESPIRATION RATE: 23 BRPM | OXYGEN SATURATION: 100 % | DIASTOLIC BLOOD PRESSURE: 63 MMHG

## 2025-04-27 VITALS — OXYGEN SATURATION: 97 % | HEART RATE: 85 BPM

## 2025-04-27 VITALS
OXYGEN SATURATION: 97 % | HEART RATE: 80 BPM | DIASTOLIC BLOOD PRESSURE: 61 MMHG | RESPIRATION RATE: 23 BRPM | SYSTOLIC BLOOD PRESSURE: 100 MMHG

## 2025-04-27 VITALS — RESPIRATION RATE: 16 BRPM | HEART RATE: 78 BPM | OXYGEN SATURATION: 100 %

## 2025-04-27 VITALS — SYSTOLIC BLOOD PRESSURE: 117 MMHG | DIASTOLIC BLOOD PRESSURE: 75 MMHG | RESPIRATION RATE: 25 BRPM | HEART RATE: 82 BPM

## 2025-04-27 VITALS
OXYGEN SATURATION: 100 % | SYSTOLIC BLOOD PRESSURE: 151 MMHG | HEART RATE: 81 BPM | RESPIRATION RATE: 19 BRPM | DIASTOLIC BLOOD PRESSURE: 75 MMHG

## 2025-04-27 VITALS
HEART RATE: 77 BPM | RESPIRATION RATE: 21 BRPM | OXYGEN SATURATION: 99 % | SYSTOLIC BLOOD PRESSURE: 167 MMHG | DIASTOLIC BLOOD PRESSURE: 68 MMHG

## 2025-04-27 VITALS
DIASTOLIC BLOOD PRESSURE: 81 MMHG | SYSTOLIC BLOOD PRESSURE: 140 MMHG | RESPIRATION RATE: 22 BRPM | HEART RATE: 74 BPM | OXYGEN SATURATION: 100 %

## 2025-04-27 VITALS — DIASTOLIC BLOOD PRESSURE: 57 MMHG | SYSTOLIC BLOOD PRESSURE: 97 MMHG

## 2025-04-27 DIAGNOSIS — S82.251A: ICD-10-CM

## 2025-04-27 DIAGNOSIS — S82.451A: Primary | ICD-10-CM

## 2025-04-27 DIAGNOSIS — Z98.84: ICD-10-CM
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
## 2025-04-27 PROCEDURE — 73620 X-RAY EXAM OF FOOT: CPT

## 2025-04-27 PROCEDURE — 99152 MOD SED SAME PHYS/QHP 5/>YRS: CPT

## 2025-04-27 PROCEDURE — 73590 X-RAY EXAM OF LOWER LEG: CPT

## 2025-04-27 PROCEDURE — 96375 TX/PRO/DX INJ NEW DRUG ADDON: CPT

## 2025-04-27 PROCEDURE — 99284 EMERGENCY DEPT VISIT MOD MDM: CPT

## 2025-04-27 PROCEDURE — 27752 TREATMENT OF TIBIA FRACTURE: CPT

## 2025-04-27 PROCEDURE — 73610 X-RAY EXAM OF ANKLE: CPT

## 2025-04-27 PROCEDURE — 96374 THER/PROPH/DIAG INJ IV PUSH: CPT

## 2025-04-28 ENCOUNTER — HOSPITAL ENCOUNTER (OUTPATIENT)
Age: 65
End: 2025-04-28
Payer: OTHER GOVERNMENT

## 2025-04-28 VITALS — BODY MASS INDEX: 31.3 KG/M2

## 2025-04-28 DIAGNOSIS — S82.871A: Primary | ICD-10-CM

## 2025-04-28 DIAGNOSIS — M89.9: ICD-10-CM

## 2025-04-28 DIAGNOSIS — S82.831A: ICD-10-CM
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
## 2025-04-28 DIAGNOSIS — S82.54XA: ICD-10-CM

## 2025-04-28 DIAGNOSIS — M25.471: ICD-10-CM

## 2025-04-28 PROCEDURE — 73700 CT LOWER EXTREMITY W/O DYE: CPT

## 2025-05-02 ENCOUNTER — HOSPITAL ENCOUNTER (EMERGENCY)
Age: 65
Discharge: HOME | End: 2025-05-02
Payer: MEDICARE

## 2025-05-02 VITALS
RESPIRATION RATE: 16 BRPM | HEART RATE: 65 BPM | DIASTOLIC BLOOD PRESSURE: 92 MMHG | SYSTOLIC BLOOD PRESSURE: 140 MMHG | OXYGEN SATURATION: 98 %

## 2025-05-02 VITALS
RESPIRATION RATE: 18 BRPM | HEART RATE: 107 BPM | DIASTOLIC BLOOD PRESSURE: 102 MMHG | TEMPERATURE: 98.6 F | OXYGEN SATURATION: 99 % | SYSTOLIC BLOOD PRESSURE: 167 MMHG | BODY MASS INDEX: 29.2 KG/M2

## 2025-05-02 VITALS — OXYGEN SATURATION: 100 % | HEART RATE: 101 BPM

## 2025-05-02 DIAGNOSIS — S82.851A: Primary | ICD-10-CM
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
## 2025-05-02 PROCEDURE — 73610 X-RAY EXAM OF ANKLE: CPT

## 2025-05-02 PROCEDURE — 36415 COLL VENOUS BLD VENIPUNCTURE: CPT

## 2025-05-02 PROCEDURE — 96376 TX/PRO/DX INJ SAME DRUG ADON: CPT

## 2025-05-02 PROCEDURE — 96374 THER/PROPH/DIAG INJ IV PUSH: CPT

## 2025-05-02 PROCEDURE — 99284 EMERGENCY DEPT VISIT MOD MDM: CPT

## 2025-05-07 ENCOUNTER — HOSPITAL ENCOUNTER (OUTPATIENT)
Age: 65
Discharge: HOME | End: 2025-05-07
Payer: MEDICARE

## 2025-05-07 VITALS
HEART RATE: 75 BPM | SYSTOLIC BLOOD PRESSURE: 131 MMHG | TEMPERATURE: 97.88 F | RESPIRATION RATE: 15 BRPM | OXYGEN SATURATION: 95 % | DIASTOLIC BLOOD PRESSURE: 74 MMHG

## 2025-05-07 VITALS
SYSTOLIC BLOOD PRESSURE: 111 MMHG | RESPIRATION RATE: 16 BRPM | HEART RATE: 83 BPM | DIASTOLIC BLOOD PRESSURE: 73 MMHG | OXYGEN SATURATION: 100 %

## 2025-05-07 VITALS
HEART RATE: 88 BPM | DIASTOLIC BLOOD PRESSURE: 97 MMHG | SYSTOLIC BLOOD PRESSURE: 150 MMHG | TEMPERATURE: 98.1 F | RESPIRATION RATE: 16 BRPM | BODY MASS INDEX: 28.3 KG/M2 | OXYGEN SATURATION: 96 %

## 2025-05-07 VITALS
HEART RATE: 78 BPM | OXYGEN SATURATION: 99 % | RESPIRATION RATE: 12 BRPM | DIASTOLIC BLOOD PRESSURE: 83 MMHG | SYSTOLIC BLOOD PRESSURE: 153 MMHG | TEMPERATURE: 97.7 F

## 2025-05-07 VITALS
DIASTOLIC BLOOD PRESSURE: 84 MMHG | RESPIRATION RATE: 12 BRPM | HEART RATE: 76 BPM | SYSTOLIC BLOOD PRESSURE: 148 MMHG | OXYGEN SATURATION: 96 %

## 2025-05-07 VITALS
HEART RATE: 79 BPM | SYSTOLIC BLOOD PRESSURE: 155 MMHG | OXYGEN SATURATION: 99 % | TEMPERATURE: 96.98 F | RESPIRATION RATE: 20 BRPM | DIASTOLIC BLOOD PRESSURE: 82 MMHG

## 2025-05-07 VITALS
HEART RATE: 80 BPM | OXYGEN SATURATION: 100 % | TEMPERATURE: 97 F | RESPIRATION RATE: 14 BRPM | DIASTOLIC BLOOD PRESSURE: 84 MMHG | SYSTOLIC BLOOD PRESSURE: 146 MMHG

## 2025-05-07 VITALS
SYSTOLIC BLOOD PRESSURE: 150 MMHG | TEMPERATURE: 97 F | OXYGEN SATURATION: 97 % | RESPIRATION RATE: 12 BRPM | HEART RATE: 77 BPM | DIASTOLIC BLOOD PRESSURE: 84 MMHG

## 2025-05-07 VITALS
DIASTOLIC BLOOD PRESSURE: 78 MMHG | SYSTOLIC BLOOD PRESSURE: 130 MMHG | OXYGEN SATURATION: 97 % | HEART RATE: 77 BPM | RESPIRATION RATE: 12 BRPM

## 2025-05-07 VITALS
OXYGEN SATURATION: 99 % | DIASTOLIC BLOOD PRESSURE: 89 MMHG | HEART RATE: 77 BPM | SYSTOLIC BLOOD PRESSURE: 152 MMHG | RESPIRATION RATE: 16 BRPM | TEMPERATURE: 96.98 F

## 2025-05-07 VITALS
OXYGEN SATURATION: 97 % | DIASTOLIC BLOOD PRESSURE: 89 MMHG | RESPIRATION RATE: 20 BRPM | TEMPERATURE: 97.1 F | SYSTOLIC BLOOD PRESSURE: 155 MMHG | HEART RATE: 76 BPM

## 2025-05-07 VITALS
RESPIRATION RATE: 16 BRPM | DIASTOLIC BLOOD PRESSURE: 87 MMHG | OXYGEN SATURATION: 100 % | SYSTOLIC BLOOD PRESSURE: 136 MMHG | HEART RATE: 82 BPM | TEMPERATURE: 98.6 F

## 2025-05-07 DIAGNOSIS — S82.831A: ICD-10-CM

## 2025-05-07 DIAGNOSIS — S82.871A: Primary | ICD-10-CM

## 2025-05-07 DIAGNOSIS — G89.18: ICD-10-CM
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
## 2025-05-07 PROCEDURE — C1713 ANCHOR/SCREW BN/BN,TIS/BN: HCPCS

## 2025-05-07 PROCEDURE — 76000 FLUOROSCOPY <1 HR PHYS/QHP: CPT

## 2025-05-07 PROCEDURE — 73610 X-RAY EXAM OF ANKLE: CPT

## 2025-05-07 PROCEDURE — 64450 NJX AA&/STRD OTHER PN/BRANCH: CPT

## 2025-05-07 PROCEDURE — 27828 TREAT LOWER LEG FRACTURE: CPT

## 2025-05-07 PROCEDURE — 0SSF04Z REPOSITION RIGHT ANKLE JOINT WITH INTERNAL FIXATION DEVICE, OPEN APPROACH: ICD-10-PCS | Performed by: ORTHOPAEDIC SURGERY

## 2025-06-04 ENCOUNTER — HOSPITAL ENCOUNTER (OUTPATIENT)
Age: 65
Discharge: HOME | End: 2025-06-04
Payer: MEDICARE

## 2025-06-04 VITALS
RESPIRATION RATE: 16 BRPM | SYSTOLIC BLOOD PRESSURE: 155 MMHG | OXYGEN SATURATION: 96 % | TEMPERATURE: 97.1 F | HEART RATE: 91 BPM | DIASTOLIC BLOOD PRESSURE: 103 MMHG

## 2025-06-04 VITALS
DIASTOLIC BLOOD PRESSURE: 63 MMHG | SYSTOLIC BLOOD PRESSURE: 128 MMHG | OXYGEN SATURATION: 96 % | HEART RATE: 80 BPM | RESPIRATION RATE: 16 BRPM

## 2025-06-04 VITALS
OXYGEN SATURATION: 10 % | SYSTOLIC BLOOD PRESSURE: 138 MMHG | RESPIRATION RATE: 98 BRPM | DIASTOLIC BLOOD PRESSURE: 88 MMHG | HEART RATE: 86 BPM

## 2025-06-04 VITALS
OXYGEN SATURATION: 95 % | SYSTOLIC BLOOD PRESSURE: 118 MMHG | RESPIRATION RATE: 10 BRPM | DIASTOLIC BLOOD PRESSURE: 79 MMHG | HEART RATE: 105 BPM | TEMPERATURE: 97.16 F

## 2025-06-04 VITALS
HEART RATE: 87 BPM | SYSTOLIC BLOOD PRESSURE: 167 MMHG | OXYGEN SATURATION: 97 % | DIASTOLIC BLOOD PRESSURE: 100 MMHG | TEMPERATURE: 96.98 F | RESPIRATION RATE: 20 BRPM

## 2025-06-04 VITALS — BODY MASS INDEX: 31.3 KG/M2 | BODY MASS INDEX: 28.3 KG/M2 | BODY MASS INDEX: 30.3 KG/M2

## 2025-06-04 VITALS
DIASTOLIC BLOOD PRESSURE: 78 MMHG | RESPIRATION RATE: 12 BRPM | OXYGEN SATURATION: 97 % | HEART RATE: 90 BPM | SYSTOLIC BLOOD PRESSURE: 126 MMHG

## 2025-06-04 VITALS
RESPIRATION RATE: 12 BRPM | OXYGEN SATURATION: 99 % | HEART RATE: 82 BPM | SYSTOLIC BLOOD PRESSURE: 141 MMHG | DIASTOLIC BLOOD PRESSURE: 97 MMHG

## 2025-06-04 VITALS
HEART RATE: 84 BPM | OXYGEN SATURATION: 98 % | RESPIRATION RATE: 13 BRPM | SYSTOLIC BLOOD PRESSURE: 126 MMHG | DIASTOLIC BLOOD PRESSURE: 76 MMHG

## 2025-06-04 VITALS
TEMPERATURE: 97.88 F | RESPIRATION RATE: 20 BRPM | HEART RATE: 95 BPM | SYSTOLIC BLOOD PRESSURE: 139 MMHG | DIASTOLIC BLOOD PRESSURE: 91 MMHG | OXYGEN SATURATION: 98 %

## 2025-06-04 VITALS
DIASTOLIC BLOOD PRESSURE: 84 MMHG | OXYGEN SATURATION: 94 % | SYSTOLIC BLOOD PRESSURE: 127 MMHG | RESPIRATION RATE: 10 BRPM | HEART RATE: 94 BPM

## 2025-06-04 VITALS
SYSTOLIC BLOOD PRESSURE: 155 MMHG | HEART RATE: 91 BPM | OXYGEN SATURATION: 97 % | DIASTOLIC BLOOD PRESSURE: 99 MMHG | RESPIRATION RATE: 12 BRPM

## 2025-06-04 VITALS
SYSTOLIC BLOOD PRESSURE: 159 MMHG | HEART RATE: 88 BPM | RESPIRATION RATE: 14 BRPM | DIASTOLIC BLOOD PRESSURE: 103 MMHG | OXYGEN SATURATION: 98 %

## 2025-06-04 DIAGNOSIS — I73.00: ICD-10-CM

## 2025-06-04 DIAGNOSIS — M06.9: ICD-10-CM

## 2025-06-04 DIAGNOSIS — T81.31XA: Primary | ICD-10-CM

## 2025-06-04 PROCEDURE — 87205 SMEAR GRAM STAIN: CPT

## 2025-06-04 PROCEDURE — 11043 DBRDMT MUSC&/FSCA 1ST 20/<: CPT

## 2025-06-04 PROCEDURE — 87070 CULTURE OTHR SPECIMN AEROBIC: CPT

## 2025-06-04 PROCEDURE — 11042 DBRDMT SUBQ TIS 1ST 20SQCM/<: CPT

## 2025-06-04 PROCEDURE — 87075 CULTR BACTERIA EXCEPT BLOOD: CPT

## 2025-07-01 ENCOUNTER — HOSPITAL ENCOUNTER (OUTPATIENT)
Age: 65
End: 2025-07-01
Payer: MEDICARE

## 2025-07-01 VITALS — BODY MASS INDEX: 31.3 KG/M2

## 2025-07-01 DIAGNOSIS — T84.7XXA: Primary | ICD-10-CM

## 2025-07-01 LAB
ADD MANUAL DIFF / SLIDE REVIEW: NO
ALBUMIN SERPL-MCNC: 3.6 G/DL (ref 3.5–5)
ALBUMIN/GLOB SERPL: 1.3 {RATIO} (ref 1–2.8)
ALP SERPL-CCNC: 79 U/L (ref 38–126)
ALT SERPL-CCNC: 12 IU/L (ref ?–35)
AST SERPL-CCNC: 20 IU/L (ref 14–36)
BASOPHILS # BLD AUTO: 100 /UL (ref 0–100)
BASOPHILS NFR BLD AUTO: 1.1 % (ref 0–2)
BILIRUB SERPL-MCNC: 0.5 MG/DL (ref 0.2–1.3)
BUN SERPL-MCNC: 8 MG/DL (ref 7–17)
BUN/CREAT SERPL: 11 (ref 6–22)
CALCIUM SERPL-MCNC: 8.6 MG/DL (ref 8.4–10.2)
CHLORIDE SERPL-SCNC: 105 MMOL/L (ref 98–107)
CO2 SERPL-SCNC: 25 MMOL/L (ref 22–32)
CREAT SERPL-MCNC: 0.73 MG/DL (ref 0.52–1.04)
CRP SERPL-MCNC: 0.6 MG/DL (ref ?–1)
EOSINOPHIL # BLD AUTO: 200 /UL (ref 0–450)
EOSINOPHIL NFR BLD AUTO: 3.2 % (ref 2–4)
ESTIMATED GLOMERULAR FILT RATE: > 60 ML/MIN (ref 60–?)
GLOBULIN SER CALC-MCNC: 2.8 G/DL (ref 1.7–4.1)
GLUCOSE SERPL-MCNC: 115 MG/DL (ref 70–99)
HEMATOCRIT: 39.6 % (ref 36–46)
HEMOGLOBIN: 13 G/DL (ref 12–16)
HEMOLYSIS: < 15 (ref 0–50)
LYMPHOCYTES # SPEC AUTO: 1300 /UL (ref 1100–4500)
LYMPHOCYTES PERCENT AUTO: 23.2 % (ref 25–40)
MCH RBC QN AUTO: 30.3 PG (ref 26–34)
MCV RBC: 92.6 FL (ref 80–100)
MEAN CORPUSCULAR HGB CONC: 32.8 % (ref 30–36)
MONOCYTES # BLD AUTO: 600 /UL (ref 0–900)
MONOCYTES NFR BLD AUTO: 10.2 % (ref 3–14)
NEUTROPHILS # BLD AUTO: 3600 /UL (ref 1500–7000)
NEUTROPHILS NFR BLD AUTO: 62.3 % (ref 50–75)
PLATELET COUNT: 242 X10^3/UL (ref 150–400)
POTASSIUM SERPL-SCNC: 3.7 MMOL/L (ref 3.4–5.1)
PROT SERPL-MCNC: 6.4 G/DL (ref 6.3–8.2)
RED BLOOD CELL COUNT: 4.28 X10^6/UL (ref 4–5.2)
RED CELL DISTRIBUTION WIDTH: 15.4 % (ref 11.6–14.8)
SODIUM SERPL-SCNC: 137 MMOL/L (ref 137–145)
WHITE BLOOD CELL COUNT: 5.7 X10^3/UL (ref 4.5–11)

## 2025-07-01 PROCEDURE — 86140 C-REACTIVE PROTEIN: CPT

## 2025-07-01 PROCEDURE — 85025 COMPLETE CBC W/AUTO DIFF WBC: CPT

## 2025-07-01 PROCEDURE — 80053 COMPREHEN METABOLIC PANEL: CPT

## 2025-07-01 PROCEDURE — 36415 COLL VENOUS BLD VENIPUNCTURE: CPT

## 2025-07-07 ENCOUNTER — HOSPITAL ENCOUNTER (OUTPATIENT)
Age: 65
End: 2025-07-07
Payer: MEDICARE

## 2025-07-07 VITALS — BODY MASS INDEX: 31.3 KG/M2

## 2025-07-07 DIAGNOSIS — T84.7XXA: Primary | ICD-10-CM

## 2025-07-07 LAB
ADD MANUAL DIFF / SLIDE REVIEW: NO
ALBUMIN SERPL-MCNC: 3.6 G/DL (ref 3.5–5)
ALBUMIN/GLOB SERPL: 1.2 {RATIO} (ref 1–2.8)
ALP SERPL-CCNC: 70 U/L (ref 38–126)
ALT SERPL-CCNC: 12 IU/L (ref ?–35)
AST SERPL-CCNC: 24 IU/L (ref 14–36)
BASOPHILS # BLD AUTO: 100 /UL (ref 0–100)
BASOPHILS NFR BLD AUTO: 1.4 % (ref 0–2)
BILIRUB SERPL-MCNC: 0.6 MG/DL (ref 0.2–1.3)
BUN SERPL-MCNC: 7 MG/DL (ref 7–17)
BUN/CREAT SERPL: 9.7 (ref 6–22)
CALCIUM SERPL-MCNC: 9.1 MG/DL (ref 8.4–10.2)
CHLORIDE SERPL-SCNC: 104 MMOL/L (ref 98–107)
CO2 SERPL-SCNC: 33 MMOL/L (ref 22–32)
CREAT SERPL-MCNC: 0.72 MG/DL (ref 0.52–1.04)
CRP SERPL-MCNC: < 0.5 MG/DL (ref ?–1)
EOSINOPHIL # BLD AUTO: 200 /UL (ref 0–450)
EOSINOPHIL NFR BLD AUTO: 3.7 % (ref 2–4)
ERYTHROCYTE [SEDIMENTATION RATE] IN BLOOD: 29 MM/HR (ref 0–20)
ESTIMATED GLOMERULAR FILT RATE: > 60 ML/MIN (ref 60–?)
GLOBULIN SER CALC-MCNC: 3 G/DL (ref 1.7–4.1)
GLUCOSE SERPL-MCNC: 90 MG/DL (ref 70–99)
HBV SURFACE AG SERPL QL NT: (no result)
HEMATOCRIT: 39.2 % (ref 36–46)
HEMOGLOBIN: 13 G/DL (ref 12–16)
HEMOLYSIS: < 15 (ref 0–50)
LYMPHOCYTES # SPEC AUTO: 1600 /UL (ref 1100–4500)
LYMPHOCYTES PERCENT AUTO: 31.2 % (ref 25–40)
MCH RBC QN AUTO: 30.5 PG (ref 26–34)
MCV RBC: 92.2 FL (ref 80–100)
MEAN CORPUSCULAR HGB CONC: 33.1 % (ref 30–36)
MONOCYTES # BLD AUTO: 500 /UL (ref 0–900)
MONOCYTES NFR BLD AUTO: 10.2 % (ref 3–14)
NEUTROPHILS # BLD AUTO: 2800 /UL (ref 1500–7000)
NEUTROPHILS NFR BLD AUTO: 53.5 % (ref 50–75)
PLATELET COUNT: 243 X10^3/UL (ref 150–400)
POTASSIUM SERPL-SCNC: 4.3 MMOL/L (ref 3.4–5.1)
PROT SERPL-MCNC: 6.6 G/DL (ref 6.3–8.2)
RED BLOOD CELL COUNT: 4.26 X10^6/UL (ref 4–5.2)
RED CELL DISTRIBUTION WIDTH: 15 % (ref 11.6–14.8)
SODIUM SERPL-SCNC: 138 MMOL/L (ref 137–145)
WHITE BLOOD CELL COUNT: 5.2 X10^3/UL (ref 4.5–11)

## 2025-07-07 PROCEDURE — 80053 COMPREHEN METABOLIC PANEL: CPT

## 2025-07-07 PROCEDURE — 86140 C-REACTIVE PROTEIN: CPT

## 2025-07-07 PROCEDURE — 36415 COLL VENOUS BLD VENIPUNCTURE: CPT

## 2025-07-07 PROCEDURE — 85651 RBC SED RATE NONAUTOMATED: CPT

## 2025-07-07 PROCEDURE — 85025 COMPLETE CBC W/AUTO DIFF WBC: CPT

## 2025-07-07 PROCEDURE — 80074 ACUTE HEPATITIS PANEL: CPT

## 2025-07-08 LAB
HAV IGM SERPL QL IA: NEGATIVE
HBV CORE IGM SERPL QL IA: NEGATIVE
HBV SURFACE AG SERPL QL IA: NEGATIVE
HCV AB S/CO SERPL IA: NON REACTIVE
HCV AB SERPL QL IA: (no result)

## 2025-07-11 ENCOUNTER — HOSPITAL ENCOUNTER (OUTPATIENT)
Age: 65
End: 2025-07-11
Payer: MEDICARE

## 2025-07-11 VITALS — BODY MASS INDEX: 31.3 KG/M2

## 2025-07-11 DIAGNOSIS — S82.871D: Primary | ICD-10-CM
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
## 2025-07-11 DIAGNOSIS — S82.831D: ICD-10-CM

## 2025-07-11 PROCEDURE — 73700 CT LOWER EXTREMITY W/O DYE: CPT

## 2025-07-21 ENCOUNTER — HOSPITAL ENCOUNTER (OUTPATIENT)
Age: 65
End: 2025-07-21
Payer: MEDICARE

## 2025-07-21 VITALS — BODY MASS INDEX: 31.3 KG/M2

## 2025-07-21 DIAGNOSIS — Z87.81: ICD-10-CM

## 2025-07-21 DIAGNOSIS — M51.362: ICD-10-CM

## 2025-07-21 DIAGNOSIS — M54.14: Primary | ICD-10-CM

## 2025-07-21 DIAGNOSIS — G89.29: ICD-10-CM

## 2025-07-21 DIAGNOSIS — M47.816: ICD-10-CM

## 2025-07-21 DIAGNOSIS — R07.81: ICD-10-CM

## 2025-07-21 PROCEDURE — 72072 X-RAY EXAM THORAC SPINE 3VWS: CPT

## 2025-07-21 PROCEDURE — 72110 X-RAY EXAM L-2 SPINE 4/>VWS: CPT
